# Patient Record
Sex: FEMALE | Race: WHITE | Employment: UNEMPLOYED | URBAN - METROPOLITAN AREA
[De-identification: names, ages, dates, MRNs, and addresses within clinical notes are randomized per-mention and may not be internally consistent; named-entity substitution may affect disease eponyms.]

---

## 2017-11-26 PROBLEM — N17.9 AKI (ACUTE KIDNEY INJURY) (HCC): Status: ACTIVE | Noted: 2017-11-26

## 2018-02-22 ENCOUNTER — TELEPHONE (OUTPATIENT)
Dept: PULMONOLOGY | Age: 63
End: 2018-02-22

## 2018-09-05 ENCOUNTER — HOSPITAL ENCOUNTER (OUTPATIENT)
Dept: WOUND CARE | Age: 63
Discharge: HOME | End: 2018-09-05

## 2018-09-05 VITALS
SYSTOLIC BLOOD PRESSURE: 134 MMHG | BODY MASS INDEX: 21.28 KG/M2 | WEIGHT: 108.4 LBS | TEMPERATURE: 99.1 F | DIASTOLIC BLOOD PRESSURE: 79 MMHG | HEIGHT: 60 IN | HEART RATE: 85 BPM | RESPIRATION RATE: 16 BRPM

## 2018-09-05 DIAGNOSIS — D64.9 NORMOCYTIC ANEMIA: ICD-10-CM

## 2018-09-05 DIAGNOSIS — N18.2 CKD (CHRONIC KIDNEY DISEASE) STAGE 2, GFR 60-89 ML/MIN: ICD-10-CM

## 2018-09-05 DIAGNOSIS — G89.29 CHRONIC PAIN OF LEFT ANKLE: Primary | ICD-10-CM

## 2018-09-05 DIAGNOSIS — M25.572 CHRONIC PAIN OF LEFT ANKLE: Primary | ICD-10-CM

## 2018-09-05 DIAGNOSIS — T81.31XA SURGICAL WOUND DEHISCENCE, INITIAL ENCOUNTER: ICD-10-CM

## 2018-09-05 DIAGNOSIS — R45.89 SYMPTOMS OF DEPRESSION: ICD-10-CM

## 2018-09-05 DIAGNOSIS — M79.2 NEUROPATHIC PAIN OF LEFT LOWER EXTREMITY: ICD-10-CM

## 2018-09-05 PROCEDURE — 99203 OFFICE O/P NEW LOW 30 MIN: CPT | Performed by: INTERNAL MEDICINE

## 2018-09-05 PROCEDURE — 99213 OFFICE O/P EST LOW 20 MIN: CPT

## 2018-09-05 PROCEDURE — 97597 DBRDMT OPN WND 1ST 20 CM/<: CPT

## 2018-09-05 PROCEDURE — 97597 DBRDMT OPN WND 1ST 20 CM/<: CPT | Performed by: INTERNAL MEDICINE

## 2018-09-05 RX ORDER — M-VIT,TX,IRON,MINS/CALC/FOLIC 27MG-0.4MG
1 TABLET ORAL DAILY
COMMUNITY

## 2018-09-05 RX ORDER — ASPIRIN 81 MG/1
81 TABLET, CHEWABLE ORAL DAILY
COMMUNITY

## 2018-09-05 RX ORDER — HYDROCODONE BITARTRATE AND ACETAMINOPHEN 5; 325 MG/1; MG/1
1 TABLET ORAL 3 TIMES DAILY PRN
Qty: 21 TABLET | Refills: 0 | Status: SHIPPED | OUTPATIENT
Start: 2018-09-05 | End: 2018-09-12

## 2018-09-05 RX ORDER — LIDOCAINE 40 MG/G
CREAM TOPICAL PRN
Status: DISCONTINUED | OUTPATIENT
Start: 2018-09-05 | End: 2018-09-06 | Stop reason: HOSPADM

## 2018-09-05 ASSESSMENT — PAIN DESCRIPTION - DESCRIPTORS: DESCRIPTORS: SHOOTING

## 2018-09-05 ASSESSMENT — PAIN DESCRIPTION - ORIENTATION: ORIENTATION: LEFT

## 2018-09-05 ASSESSMENT — PAIN DESCRIPTION - PAIN TYPE: TYPE: CHRONIC PAIN

## 2018-09-05 ASSESSMENT — PAIN DESCRIPTION - LOCATION: LOCATION: ANKLE

## 2018-09-05 ASSESSMENT — PAIN DESCRIPTION - FREQUENCY: FREQUENCY: INTERMITTENT

## 2018-09-05 ASSESSMENT — PAIN DESCRIPTION - PROGRESSION: CLINICAL_PROGRESSION: NOT CHANGED

## 2018-09-05 ASSESSMENT — PAIN DESCRIPTION - ONSET: ONSET: ON-GOING

## 2018-09-05 ASSESSMENT — PAIN SCALES - GENERAL: PAINLEVEL_OUTOF10: 7

## 2018-09-06 ENCOUNTER — CLINICAL DOCUMENTATION (OUTPATIENT)
Dept: SPIRITUAL SERVICES | Age: 63
End: 2018-09-06

## 2018-09-06 ENCOUNTER — TELEPHONE (OUTPATIENT)
Dept: FAMILY MEDICINE CLINIC | Age: 63
End: 2018-09-06

## 2018-09-07 ENCOUNTER — CLINICAL DOCUMENTATION (OUTPATIENT)
Dept: SPIRITUAL SERVICES | Age: 63
End: 2018-09-07

## 2018-09-07 RX ORDER — DOXYCYCLINE 100 MG/1
100 CAPSULE ORAL 2 TIMES DAILY
Qty: 20 CAPSULE | Refills: 0 | Status: SHIPPED | OUTPATIENT
Start: 2018-09-07 | End: 2018-09-12 | Stop reason: ALTCHOICE

## 2018-09-10 ENCOUNTER — CLINICAL DOCUMENTATION (OUTPATIENT)
Dept: SPIRITUAL SERVICES | Age: 63
End: 2018-09-10

## 2018-09-11 PROBLEM — F41.1 ANXIETY STATE: Status: ACTIVE | Noted: 2018-09-11

## 2018-09-11 PROBLEM — M54.50 CHRONIC LOW BACK PAIN WITHOUT SCIATICA: Status: ACTIVE | Noted: 2018-09-11

## 2018-09-11 PROBLEM — M45.9 ANKYLOSING SPONDYLITIS (HCC): Status: ACTIVE | Noted: 2018-09-11

## 2018-09-11 PROBLEM — F17.200 TOBACCO USE DISORDER: Status: ACTIVE | Noted: 2018-09-11

## 2018-09-11 PROBLEM — J44.9 OTHER SPECIFIED CHRONIC OBSTRUCTIVE AIRWAYS DISEASE: Status: ACTIVE | Noted: 2018-09-11

## 2018-09-11 PROBLEM — N18.2 CKD (CHRONIC KIDNEY DISEASE) STAGE 2, GFR 60-89 ML/MIN: Status: ACTIVE | Noted: 2018-09-11

## 2018-09-11 PROBLEM — D64.9 NORMOCYTIC ANEMIA: Status: ACTIVE | Noted: 2018-09-11

## 2018-09-11 PROBLEM — G89.29 CHRONIC LOW BACK PAIN WITHOUT SCIATICA: Status: ACTIVE | Noted: 2018-09-11

## 2018-09-11 PROBLEM — Z79.891 LONG-TERM CURRENT USE OF OPIATE ANALGESIC: Status: ACTIVE | Noted: 2018-06-05

## 2018-09-11 PROBLEM — N17.9 AKI (ACUTE KIDNEY INJURY) (HCC): Status: RESOLVED | Noted: 2017-11-26 | Resolved: 2018-09-11

## 2018-09-11 PROBLEM — F41.9 ANXIETY AND DEPRESSION: Status: ACTIVE | Noted: 2018-09-11

## 2018-09-11 PROBLEM — M54.9 BACKACHE: Status: ACTIVE | Noted: 2018-09-11

## 2018-09-11 PROBLEM — T81.31XA SURGICAL WOUND DEHISCENCE, INITIAL ENCOUNTER: Status: ACTIVE | Noted: 2018-09-11

## 2018-09-11 PROBLEM — M79.2 NEUROPATHIC PAIN OF LEFT LOWER EXTREMITY: Status: ACTIVE | Noted: 2018-09-11

## 2018-09-11 PROBLEM — M47.816 DJD (DEGENERATIVE JOINT DISEASE), LUMBAR: Status: ACTIVE | Noted: 2018-09-11

## 2018-09-11 PROBLEM — F32.A ANXIETY AND DEPRESSION: Status: ACTIVE | Noted: 2018-09-11

## 2018-09-11 PROBLEM — J31.0 CHRONIC RHINITIS: Status: ACTIVE | Noted: 2018-09-11

## 2018-09-11 NOTE — H&P
prescribing any pain medication at all from now on; Mrs. Rachelle Hudson doesn't really have a plan for how she's going to handle that; apparently the possibility of a pain mgmt referral was mentioned, but nothing was actually done (?). She's also been having a very hard time in recent months with the loss of her , the loss of her brother, and her need to move back to [address] [place] [address] [employer] for some support from extended family. She tells me that she doesn't really have the local support she thought she'd have, and she's struggling as the primary caregiver of her 6year-old great-granddaughter. She does admit to occasional thoughts of suicide, or more commonly just wishing that she wouldn't wake up the next day, but she knows that she has to keep going for her great-granddaughter, whom she describes as a great kid, very strong, and who \"takes care of her as much as she takes care of the child\". Denies any suicidal plan or active ideation today. Additional ulcer(s) noted? no.      Ms. Rachelle Hudson has a past medical history of ALBERTO (acute kidney injury) ([facility] Bronchitis; Rotator cuff tear; Seizure ([facility] and Shingles. She has a past surgical history that includes Tubal ligation and Ankle fracture surgery (Left, 06/25/2018). Her family history includes Cancer in her brother and father; Kidney Disease in her sister. Ms. Rachelle Hudson reports that she has been smoking. She has a 20.00 pack-year smoking history. She has never used smokeless tobacco. She reports that she does not drink alcohol or use drugs. Her current medication list consists of HYDROcodone-acetaminophen, albuterol, aspirin, gabapentin, therapeutic multivitamin-minerals, and venlafaxine.  She tells me that she has enough gabapentin to last at least another week, but is out of Norco.    Allergies: Lisinopril; Nabumetone; Propoxyphene; Sulfa antibiotics; and Nickel    Pertinent items from the review of systems are discussed in the HPI; the remainder of the ROS was reviewed and is negative. Objective:     Vitals:    09/05/18 1239   BP: 134/79   Pulse: 85   Resp: 16   Temp: 99.1 °F (37.3 °C)   TempSrc: Oral   Weight: 108 lb 6.4 oz (49.2 kg)   Height: 5' (1.524 m)     TRACEE today -- 0.96 bilateral (arm systolics 906 & 252, right ankle 120 & 128, left ankle 128 x 2). Constitutional:  well-developed, thin, weak, fatigued, chronically ill appearing  Psychiatric:  oriented to person, place and time; affect quite depressed, tearful at times  Eyes:  pupils equal, round and reactive to light; sclerae anicteric, conjunctivae not pale  ENT: no thrush or oral ulcers, mucous membranes moist  Abd: soft, NT, ND, good BS  Cardiovascular:  bilateral pedal pulses palpable; mild left lower extremity edema, foot warm, good cap refill  Lymphatic:  no inguinal or popliteal adenopathy, no lymphangitis or cellulitis  Musculoskeletal:  no clubbing, cyanosis or petechiae; RLE and LLE with no gross effusions, joint misalignment or acute arthritis  Bre-ulcer skin:  Induration, Warmth, Pink and mildly macerated hyperkeratosis. Ulcer(s):  a few small areas of superficial dehiscence, with a red base, a bit of fibrin and biofilm, no deep tissue or hardware exposure, mild serous exudate; then at her lateral malleolus there is a tiny focus that looks like it might be a pustule with a pinpoint, black agustina in its center. Photos also saved in electronic chart.     Today's Wound Measurements:  Wound 09/05/18 #1- L lateral lower leg, surgical dehis, partial thickness, onset 6/25/2018, surgical -Wound Length (cm): 3 cm    Wound 09/05/18 #1- L lateral lower leg, surgical dehis, partial thickness, onset 6/25/2018, surgical -Wound Width (cm): 0.4 cm    Wound 09/05/18 #1- L lateral lower leg, surgical dehis, partial thickness, onset 6/25/2018, surgical -Wound Depth (cm) : 0.2  _____________________________    Lab Results   Component Value Date    LABALBU 3.6 11/27/2017     Lab Results   Component Value Date CREATININE 1.2 11/27/2017     Lab Results   Component Value Date    HGB 9.1 (L) 11/27/2017     More recently, in [place] on 8/7/18 -- ESR 46, cRP 32, Hgb A1c 5.6%, creat 0.97, albumin 4.3, liver enzymes normal (alk phos 106), WBC 9.2, ANC 6600, Hgb 11.1, MCV 95, plts 421k. No wound cultures. July 31 XR -- The patient is status post ORIF of left ankle fractures. At the distal tibia there are 2 oblique screws through the medial malleolus. At the distal fibula there is a metallic side plate and multiple associated screws. On these images, normal anatomic alignment and position. No hardware complication. The ankle joint space is intact. Assessment:     Patient Active Problem List   Diagnosis Code    Ankylosing spondylitis ([facility] M45.9    Anxiety and depression F41.9, F32.9    Chronic low back pain without sciatica M54.5, G89.29    COPD (chronic obstructive pulmonary disease) ([facility]) J44.9    IBS (irritable bowel syndrome) K58.9    Insomnia G47.00    Long-term current use of opiate analgesic Z79.891    Chronic rhinitis J31.0    Other and unspecified hyperlipidemia E78.5    Tobacco use disorder F17.200    Surgical wound dehiscence, initial encounter T81.31XA    Neuropathic pain of left lower extremity G57.92    DJD (degenerative joint disease), lumbar M47.816    Normocytic anemia D64.9    CKD (chronic kidney disease) stage 2, GFR 60-89 ml/min N18.2       Assessment of today's active condition(s): Hx trimalleolar ankle fracture, ORIF, healed tibial wound, superficial dehiscence of lateral wound, any recent soft tissue infection seems resolved, apart from perhaps that tiny pustule at her malleolus, but no strong suspicion of osteomyelitis or hardware infection. Pain out of proportion to exam I think because of her chronic pain syndrome and neuropathy. Chitra might be risking as much skin damage as it is helping the wound at this point; she ought to do fine with a bit more debridement and just simple local care. was completed with a small amount of bleeding, and hemostasis was by pressure. The patient tolerated the procedure well, with no significant complications. The patient's level of pain during and after the procedure was monitored, and is noted in the wound documentation flowsheet. Discharge plan:     Treatment in the wound care center today: Wound 09/05/18 #1- L lateral lower leg, surgical dehis, partial thickness, onset 6/25/2018, surgical -Dressing/Treatment: Other (Comment) (PSO, 4x4, shruthi, single e tubi). I reminded the patient of the importance of weight management and smoking cessation, if applicable; also encouraged ambulation as tolerated, additional lower extremity exercises as instructed in our education sheet, leg elevation when at rest, and compliance with any recommended dietary, diuretic and compression therapies. I'm hoping a bit of edema treatment might help her pain to improve. Continue gabapentin at current doses for now (two 600 mg tabs, TID), and I can give her a new script next week, if she is running out. I reviewed her OARRS report today, and discussed the risks and benefits of both giving her a supply of Norco vs not giving her any narcotic pain medication at all. She understands the risks of both approaches, and we agreed that I would prescribe her weekly supplies of Norco for now. It is absolutely mandatory that she contact her PCP right away to arrange a pain mgmt consultation for her, for longer term mgmt. She understands that once her wound is healed, I will not be here to fill those prescriptions; she'd actually like to get off narcotics if she can, which I think is admirable. She had typically been prescribed 2 per day for her back, but was sometimes taking as many as 4 per day for her back + ankle. I called in a script for 3 per day for the week, and we'll try to wean her off over the next couple of weeks.  I informed her of my policy re: narcotic medications (she must get them only from me, only at one pharmacy, must consent to pill counts and drug screens if requested, can not get refills unless she's here for scheduled appts, etc). She voiced understanding. I'm also going to consult the spiritual care department to call and offer her some support, and will consult one of the outpatient behavioral health professionals to offer some counseling, and help to decide if she might also benefit from any change in antidepressant medication. Home treatment: good handwashing before and after any dressing changes. Cleanse ulcer with saline or soap & water before dressing change. May use Vaseline (petrolatum), Aquaphor, Aveeno, CeraVe, Cetaphil, Eucerin, Lubriderm, etc for dry skin. Dressing type for home: Other: as above, daily. Written discharge instructions given to patient. Follow up in 1 week.     Electronically signed by Namrata Rico MD on 9/11/2018 at 8:06 AM.

## 2018-09-12 ENCOUNTER — HOSPITAL ENCOUNTER (OUTPATIENT)
Dept: WOUND CARE | Age: 63
Discharge: HOME | End: 2018-09-12

## 2018-09-12 VITALS — RESPIRATION RATE: 18 BRPM

## 2018-09-12 DIAGNOSIS — G89.29 CHRONIC MIDLINE LOW BACK PAIN WITHOUT SCIATICA: Primary | ICD-10-CM

## 2018-09-12 DIAGNOSIS — M54.50 CHRONIC MIDLINE LOW BACK PAIN WITHOUT SCIATICA: Primary | ICD-10-CM

## 2018-09-12 PROCEDURE — 99212 OFFICE O/P EST SF 10 MIN: CPT

## 2018-09-12 PROCEDURE — 99213 OFFICE O/P EST LOW 20 MIN: CPT | Performed by: INTERNAL MEDICINE

## 2018-09-12 RX ORDER — HYDROCODONE BITARTRATE AND ACETAMINOPHEN 5; 325 MG/1; MG/1
1 TABLET ORAL EVERY 6 HOURS PRN
Qty: 50 TABLET | Refills: 0 | Status: SHIPPED | OUTPATIENT
Start: 2018-09-12 | End: 2018-09-26

## 2018-09-12 RX ORDER — CEPHALEXIN 500 MG/1
500 CAPSULE ORAL 4 TIMES DAILY
Qty: 28 CAPSULE | Refills: 0 | Status: SHIPPED | OUTPATIENT
Start: 2018-09-12 | End: 2018-09-19

## 2018-09-12 RX ORDER — GABAPENTIN 600 MG/1
1200 TABLET ORAL EVERY 8 HOURS
Qty: 180 TABLET | Refills: 1 | Status: SHIPPED | OUTPATIENT
Start: 2018-09-12 | End: 2020-05-05

## 2018-09-12 RX ORDER — LIDOCAINE 40 MG/G
CREAM TOPICAL ONCE
Status: DISCONTINUED | OUTPATIENT
Start: 2018-09-12 | End: 2018-09-13 | Stop reason: HOSPADM

## 2018-09-12 RX ORDER — CEPHALEXIN 500 MG/1
500 CAPSULE ORAL 3 TIMES DAILY
COMMUNITY
End: 2018-09-12

## 2018-09-12 ASSESSMENT — PAIN SCALES - GENERAL: PAINLEVEL_OUTOF10: 7

## 2018-09-12 ASSESSMENT — PAIN DESCRIPTION - ORIENTATION: ORIENTATION: LEFT

## 2018-09-12 ASSESSMENT — PAIN DESCRIPTION - PAIN TYPE: TYPE: CHRONIC PAIN

## 2018-09-12 ASSESSMENT — PAIN DESCRIPTION - LOCATION: LOCATION: ANKLE

## 2018-09-12 NOTE — PLAN OF CARE
Problem: Wound:  Goal: Will show signs of wound healing; wound closure and no evidence of infection  Will show signs of wound healing; wound closure and no evidence of infection   Outcome: Ongoing  Wound healed. Pt. To cont. With Keflex antibiotic for a cellulitis she had over the weekend & new script sent to pharmacy per Dr. Jenelle Hunt. Pt. Cont. To have significant neuropathy pain, is trying to get a pain management referral from her PCP & states she is about out of her Gabapentin. Scripts for both Norco & Gabapentin sent to pharmacy per Dr. Jenelle Hunt. Dr. Jenelle Hunt was very clear to explain that pt. Needs to follow up with PCP in regards to getting scheduled with pain management, for long-term medication refills & pt. Verbalizes understanding. Patient also states that she has been in touch with the Spiritual Car Dept. x2 over the last week. Pt. Also inquiring about the Behavioral Health team & states that she hasn't receive a call from them. Pt. Did also state that her VM on her phone has her middle name of \"Rebeca\" on it & they may have been reluctant to leave a message, thinking they may have had the wrong Ph#. Pt. Did confirm her correct ph. # & Dr. Jenelle Hunt to again reach out to the [address] [place] [address] team. Pt. To f/u in [address] [place] [address],[address] in 2 weeks as ordered. Discharge instructions reviewed with patient, all questions answered, copy given to patient.

## 2018-09-20 NOTE — PROGRESS NOTES
It does sound like she's been responsible with her Norco this week, but the #21 of the 5mg tabs didn't help too much, jeramy with the infection flare-up. She had been taking 10 mg tabs before. Additional ulcer(s) noted? no.      Ms. Jacob yonug has a past medical history of ALBERTO (acute kidney injury) ([address] Bronchitis; Rotator cuff tear; Seizure ([address] and Shingles. She has a past surgical history that includes Tubal ligation and Ankle fracture surgery (Left, 06/25/2018). Her family history includes Cancer in her brother and father; Kidney Disease in her sister. Ms. Jacob young reports that she has been smoking. She has a 20.00 pack-year smoking history. She has never used smokeless tobacco. She reports that she does not drink alcohol or use drugs. Her current medication list consists of cephalexin, HYDROcodone-acetaminophen, albuterol, aspirin, gabapentin, therapeutic multivitamin-minerals, and venlafaxine. Allergies: Lisinopril; Nabumetone; Propoxyphene; Sulfa antibiotics; and Nickel    Pertinent items from the review of systems are discussed in the HPI; the remainder of the ROS was reviewed and is negative. Objective:     Vitals:    09/12/18 1325   Resp: 18   TempSrc: Oral     Nurses failed to document other vital signs.      Constitutional:  well-developed, thin, fatigued, chronically ill appearing  Psychiatric:  oriented to person, place and time; mood stated as depressed, affect both depressed and anxious   Eyes:  pupils equal, round and reactive to light; sclerae anicteric, conjunctivae not pale  ENT: no thrush or oral ulcers, mucous membranes moist  Abd: soft, NT, ND, good BS  Cardiovascular:  bilateral pedal pulses palpable; very mild left lower extremity edema  Lymphatic:  no inguinal or popliteal adenopathy, no cellulitis or angitis  Musculoskeletal:  no clubbing, cyanosis or petechiae; RLE and LLE with no gross effusions, joint misalignment or acute arthritis  Bre-ulcer skin:  Induration, MD Elliott on 9/20/2018 at 2:36 PM.

## 2018-09-21 ENCOUNTER — CLINICAL DOCUMENTATION (OUTPATIENT)
Dept: SPIRITUAL SERVICES | Age: 63
End: 2018-09-21

## 2018-09-21 NOTE — PROGRESS NOTES
Outpatient [facility] team member attempted telephone call to pt. No answer; voice message was left encouraging patient to contact [phone] [phone]. Contact information for [facility] provided in message.

## 2018-09-26 ENCOUNTER — HOSPITAL ENCOUNTER (OUTPATIENT)
Dept: WOUND CARE | Age: 63
Discharge: HOME | End: 2018-09-26

## 2018-09-26 VITALS
WEIGHT: 106.2 LBS | BODY MASS INDEX: 20.74 KG/M2 | HEART RATE: 85 BPM | SYSTOLIC BLOOD PRESSURE: 117 MMHG | DIASTOLIC BLOOD PRESSURE: 51 MMHG | RESPIRATION RATE: 20 BRPM | TEMPERATURE: 97.8 F

## 2018-09-26 DIAGNOSIS — M25.572 CHRONIC PAIN OF LEFT ANKLE: Primary | ICD-10-CM

## 2018-09-26 DIAGNOSIS — G89.29 CHRONIC PAIN OF LEFT ANKLE: Primary | ICD-10-CM

## 2018-09-26 PROCEDURE — 99212 OFFICE O/P EST SF 10 MIN: CPT

## 2018-09-26 PROCEDURE — 99213 OFFICE O/P EST LOW 20 MIN: CPT | Performed by: INTERNAL MEDICINE

## 2018-09-26 RX ORDER — HYDROCODONE BITARTRATE AND ACETAMINOPHEN 5; 325 MG/1; MG/1
1 TABLET ORAL 3 TIMES DAILY PRN
Qty: 40 TABLET | Refills: 0 | Status: SHIPPED | OUTPATIENT
Start: 2018-09-26 | End: 2018-10-10

## 2018-09-26 ASSESSMENT — PAIN DESCRIPTION - FREQUENCY: FREQUENCY: INTERMITTENT

## 2018-09-26 ASSESSMENT — PAIN SCALES - GENERAL: PAINLEVEL_OUTOF10: 6

## 2018-09-26 ASSESSMENT — PAIN DESCRIPTION - ONSET: ONSET: ON-GOING

## 2018-09-26 ASSESSMENT — PAIN DESCRIPTION - LOCATION: LOCATION: ANKLE

## 2018-09-26 ASSESSMENT — PAIN DESCRIPTION - PAIN TYPE: TYPE: CHRONIC PAIN

## 2018-09-26 ASSESSMENT — PAIN DESCRIPTION - ORIENTATION: ORIENTATION: LEFT

## 2018-09-26 ASSESSMENT — PAIN DESCRIPTION - DESCRIPTORS: DESCRIPTORS: BURNING;STABBING

## 2018-09-26 ASSESSMENT — PAIN DESCRIPTION - PROGRESSION: CLINICAL_PROGRESSION: NOT CHANGED

## 2018-09-28 ENCOUNTER — TELEPHONE (OUTPATIENT)
Dept: FAMILY MEDICINE CLINIC | Age: 63
End: 2018-09-28

## 2018-09-28 NOTE — TELEPHONE ENCOUNTER
Good morning.   I left another message for patient to schedule an appointment with [facility] [place]  (3rd attempt) - Ignacio Long)

## 2018-10-03 ENCOUNTER — CLINICAL DOCUMENTATION (OUTPATIENT)
Dept: SPIRITUAL SERVICES | Age: 63
End: 2018-10-03

## 2018-10-04 PROBLEM — R19.7 DIARRHEA: Status: ACTIVE | Noted: 2018-10-04

## 2018-10-04 PROBLEM — T81.31XA SURGICAL WOUND DEHISCENCE, INITIAL ENCOUNTER: Status: RESOLVED | Noted: 2018-09-11 | Resolved: 2018-10-04

## 2018-10-04 PROBLEM — R10.9 ABDOMINAL PAIN: Status: ACTIVE | Noted: 2018-10-04

## 2018-10-04 NOTE — PROGRESS NOTES
long as she doesn't develop fever, shaking chills, more severe pain, more significantly spreading redness, etc. I did let her know that she may well need surgery for hardware removal and bone debridement at some point, depending on her ongoing clinical course. Continue use of gabapentin. I'll call Dr. Panchito Rico office to see if they've received an official referral; if not, I'll send paperwork myself. Calling in a script for an additional [phone] today, with the hopes that she can wean down from TID to BID and then eventually off entirely. I'll call the [facility] to see if they can have her schedule with them to see a GI specialist.    Spiritual Care department here continuing to try to reach her for some spiritual support and counseling. Behavioral Health (outpatient LISW or clinical psychologist) also trying to reach her to offer their services, in terms of therapy and also any advice on ongoing medication therapy. Written discharge instructions given to patient. Follow up here PRN.     Electronically signed by Topher Keita MD on 10/4/2018 at 3:50 PM.

## 2018-10-08 ENCOUNTER — CLINICAL DOCUMENTATION (OUTPATIENT)
Dept: SPIRITUAL SERVICES | Age: 63
End: 2018-10-08

## 2018-10-10 ENCOUNTER — OFFICE VISIT (OUTPATIENT)
Dept: ORTHOPEDIC SURGERY | Age: 63
End: 2018-10-10

## 2018-10-10 VITALS
BODY MASS INDEX: 20.86 KG/M2 | HEIGHT: 60 IN | WEIGHT: 106.26 LBS | HEART RATE: 81 BPM | DIASTOLIC BLOOD PRESSURE: 68 MMHG | SYSTOLIC BLOOD PRESSURE: 117 MMHG

## 2018-10-10 DIAGNOSIS — T81.41XA ABSCESS INVOLVING SUTURE: Primary | ICD-10-CM

## 2018-10-10 PROCEDURE — 99203 OFFICE O/P NEW LOW 30 MIN: CPT | Performed by: PODIATRIST

## 2018-10-10 NOTE — PROGRESS NOTES
HISTORY OF PRESENT ILLNESS: This is an initial visit for a 29-year-old female presents with pain to her left ankle. On June 25 she was vacationing in [place] when she unfortunately fractured her left ankle. Open reduction internal fixation was performed at that time. In the postoperative course she had to come back to this area so physical therapy as yet to be started. She's been having pain to the side of the ankle and there is suspicion for infection. She is currently on an oral antibiotic. FAMILY HISTORY: Documented in chart. SOCIAL HISTORY: Documented in chart. REVIEW OF SYSTEMS:  The patient denies any issues with dermatologic, pulmonary, cardiovascular, genitourinary, hematologic, gastrointestinal, neurologic, psychiatric, and HEENT systems. Family History, Social History, and Review of Systems were reviewed from patient history form dated on 10/10/2018 and available in the patient's chart under the MEDIA tab. PHYSICAL EXAMINATION:     The patient is alert and orientated x3. A pinhole opening is noted at the distal aspect of the incision at the lateral surface of the ankle. There is minimal edema and no erythema or ascending cellulitis noted. There is no purulent drainage. She has palpable pedal pulses bilateral.  Her sensation is grossly intact bilateral.    The remainder of the exam is unremarkable. RADIOGRAPHS: None taken      ASSESSMENT: Suture abscess, left ankle      PLAN: The patient was educated on the pathology and its treatment options. The presentation is most consistent with suture abscess. At this time I'm not able to see a Vicryl stitch that is fairly focal culprit. Eventually, this may present itself more at which point we can debride it in the office. I'll see her back in 2 weeks.

## 2018-10-16 ENCOUNTER — OFFICE VISIT (OUTPATIENT)
Dept: INTERNAL MEDICINE CLINIC | Age: 63
End: 2018-10-16

## 2018-10-16 VITALS
HEIGHT: 60 IN | BODY MASS INDEX: 21.79 KG/M2 | WEIGHT: 111 LBS | DIASTOLIC BLOOD PRESSURE: 78 MMHG | SYSTOLIC BLOOD PRESSURE: 178 MMHG | OXYGEN SATURATION: 97 % | HEART RATE: 86 BPM

## 2018-10-16 VITALS
HEART RATE: 86 BPM | BODY MASS INDEX: 21.68 KG/M2 | SYSTOLIC BLOOD PRESSURE: 192 MMHG | OXYGEN SATURATION: 97 % | RESPIRATION RATE: 16 BRPM | WEIGHT: 111 LBS | DIASTOLIC BLOOD PRESSURE: 94 MMHG

## 2018-10-16 DIAGNOSIS — D64.9 ANEMIA, UNSPECIFIED TYPE: ICD-10-CM

## 2018-10-16 DIAGNOSIS — I10 ESSENTIAL HYPERTENSION: Primary | ICD-10-CM

## 2018-10-16 DIAGNOSIS — R03.0 ELEVATED BLOOD PRESSURE READING: ICD-10-CM

## 2018-10-16 DIAGNOSIS — R73.9 HYPERGLYCEMIA: ICD-10-CM

## 2018-10-16 DIAGNOSIS — R19.5 ABNORMAL STOOLS: Primary | ICD-10-CM

## 2018-10-16 DIAGNOSIS — J43.9 PULMONARY EMPHYSEMA, UNSPECIFIED EMPHYSEMA TYPE (HCC): ICD-10-CM

## 2018-10-16 LAB
EXPIRATORY TIME: ABNORMAL SEC
FEF 25-75% %PRED-PRE: ABNORMAL L/SEC
FEF 25-75% PRED: ABNORMAL L/SEC
FEF 25-75%-PRE: ABNORMAL L/SEC
FEV1 %PRED-PRE: 2.13 %
FEV1 PRED: ABNORMAL L
FEV1/FVC %PRED-PRE: ABNORMAL %
FEV1/FVC PRED: ABNORMAL %
FEV1/FVC: 1.87 %
FEV1: ABNORMAL L
FVC %PRED-PRE: ABNORMAL %
FVC PRED: ABNORMAL L
FVC: ABNORMAL L
HBA1C MFR BLD: 5.1 %
PEF %PRED-PRE: ABNORMAL L/SEC
PEF PRED: ABNORMAL L/SEC
PEF-PRE: ABNORMAL L/SEC

## 2018-10-16 PROCEDURE — 99999 PR OFFICE/OUTPT VISIT,PROCEDURE ONLY: CPT | Performed by: INTERNAL MEDICINE

## 2018-10-16 RX ORDER — VENLAFAXINE HYDROCHLORIDE 150 MG/1
150 CAPSULE, EXTENDED RELEASE ORAL DAILY
Qty: 30 CAPSULE | Refills: 3 | Status: SHIPPED | OUTPATIENT
Start: 2018-10-16 | End: 2018-12-04

## 2018-10-16 RX ORDER — AMLODIPINE BESYLATE 5 MG/1
5 TABLET ORAL DAILY
Qty: 30 TABLET | Refills: 3 | Status: SHIPPED | OUTPATIENT
Start: 2018-10-16 | End: 2018-11-06 | Stop reason: SDUPTHER

## 2018-10-16 ASSESSMENT — PULMONARY FUNCTION TESTS
FEV1/FVC: 1.87
FEV1_PERCENT_PREDICTED_PRE: 2.13

## 2018-10-16 NOTE — PROGRESS NOTES
SUBJECTIVE:   New patient, referred by wound care. History of anxiety. History of left ankle fracture requiring surgery in [place] in June 2018 with subsequent infection requiring wound care. She was followed by Dr. Shannan De ([facility]) and is unable to get her usual Neurontin and Norco for ankylosing spondylitis. She was prescribed lisinopril in the past but this resulted in reaction that required hospitalization for acute kidney injury. She is taking venlafaxine only once per day (rather than prescribed TID) due to expense. She is smoking 0.5 - 1 ppd due to anxiety and grief related to 's death. She takes care of great granddaughter, age 6. She is using albuterol inhaler 2-3 times per day. She was followed by pulmonary in [place] and prescribed LAMA and LABA but unable to afford due to loss of insurance. She will be seen by gastroenterology (Dr. Severa Brooklyn) later today. MEDICATIONS:  gabapentin (NEURONTIN) 600 MG tablet Take 2 tablets by mouth every 8 hours for 30 days. Laurena Rad aspirin 81 MG chewable tablet Take 81 mg by mouth daily   venlafaxine (EFFEXOR) 100 MG tablet Take 100 mg by mouth 3 times daily   albuterol (PROVENTIL) (2.5 MG/3ML) 0.083% nebulizer solution Take 2.5 mg by nebulization every 6 hours as needed for Wheezing       LABS: 11/27/2017  WBC 11.9 (H)   HGB 9.1 (L)      MCV 88.3  RDW 17.2     (L)   K 4.7   CL 99   CO2 22   BUN 22 (H)   CREATININE 1.2   GLUCOSE 250 (H)     PFT: 1.27 (60%) / 1.95 (70%) = 0.65 c/w Moderately severe obstruction, with low vital capacity. OBJECTIVE:    BP (!) 192/94   Pulse 86   Resp 16   Wt 111 lb (50.3 kg)   SpO2 97%   BMI 21.68 kg/m²   HEENT:  Oropharynx clear, no lymphadenopathy,   LUNGS:  Clear and without wheezes  HEART:  Regular rhythm, no appreciable murmur  ABD:  Benign, active bowel sounds  EXT:  No edema, pulses palpable  NEURO:  No focal neurologic deficits noted. ASSESSMENT / PLAN:   1.  Hypertension (elevated

## 2018-10-16 NOTE — PROGRESS NOTES
MD   aspirin 81 MG chewable tablet Take 81 mg by mouth daily    Historical Provider, MD   Multiple Vitamins-Minerals (THERAPEUTIC MULTIVITAMIN-MINERALS) tablet Take 1 tablet by mouth daily    Historical Provider, MD   albuterol (PROVENTIL) (2.5 MG/3ML) 0.083% nebulizer solution Take 2.5 mg by nebulization every 6 hours as needed for Wheezing    Historical Provider, MD        Allergies: Allergies   Allergen Reactions    Lisinopril Other (See Comments) and Shortness Of Breath     Cough, SOB  Cough, SOB    Nabumetone Swelling     Other reaction(s): Unknown (specify in comments)    No Known Allergies Swelling    Propoxyphene Swelling     Other reaction(s): Unknown (specify in comments)    Sulfa Antibiotics Swelling    Nickel Rash       Past Medical History:   Diagnosis Date    ALBERTO (acute kidney injury) ([facility] 11/26/2017    Bronchitis     Rotator cuff tear 12/11/2015    Seizure ([facility]     Shingles     Surgical wound dehiscence, initial encounter 9/11/2018     Past Surgical History:   Procedure Laterality Date    ANKLE FRACTURE SURGERY Left 06/25/2018    Dr. Isidro Goldberg, [facility]    COLONOSCOPY  03/24/2016    \"Small Rectal polyp\" and Diverticulosis    COLONOSCOPY  9191    Date uncertain    COLONOSCOPY  1094    Date Uncertain    COLONOSCOPY  5106    Date Uncertain    TUBAL LIGATION         Social:   Social History   Substance Use Topics    Smoking status: Current Every Day Smoker     Packs/day: 0.50     Years: 40.00    Smokeless tobacco: Never Used    Alcohol use No     Family:   Family History   Problem Relation Age of Onset    Cancer Father     Kidney Disease Sister     Cancer Brother     Other Maternal Uncle         Anylosing Spondylitis       ROS: Pertinent items are noted in HPI.     Objective:   Vital Signs:     See encounter earlier today: BP (!) 192/94   Pulse 86   Resp 16   Wt 111 lb (50.3 kg)   SpO2 97%   BMI 21.68 kg/m²   Now 178/78     Physical Exam: General appearance: alert, appears stated age, cooperative, no distress and thin  Eyes: negative findings: conjunctivae and sclerae normal  Neck: no adenopathy, no carotid bruit, no JVD, supple, symmetrical, trachea midline and thyroid not enlarged, symmetric, no tenderness/mass/nodules  Lungs: clear to auscultation bilaterally and normal percussion bilaterally  Heart: regular rate and rhythm, S1, S2 normal, no murmur, click, rub or gallop  Abdomen: normal findings: bowel sounds normal, liver span normal to percussion and no organomegaly and abnormal findings:  mild diffuse, non-reproducible tenderness  Extremities: extremities normal, atraumatic, no cyanosis or edema  Pulses: 2+ and symmetric  Skin: eczema - scattered  Lymph nodes: Cervical, supraclavicular, and axillary nodes normal.  Neurologic: Mental status: Alert, oriented, thought content appropriate    Lab and Imaging Review   Component 08/07/18 [facility]    WBC 9.2    HGB 11.1    MCV 95.2        BUN 16    CREATININE 1.0    GLUCOSE 98    AST 22    ALT 17    ALKPHOS 106    BILITOT 0.2    LABA1C 5.6    ESR 46       Assessment:     Probable self limited colitis, resolved  Anemia without microcytosis, doubt iron deficiency  Nothing to suggest GI illness associated with Ankylosing spondylitis  Elevated BP    Plan:     1.  No further inpatient GI (subspecialty) follow up; Please call if needed  2. follow up with PCP: Natalie Toro MD    [facility] [address] [place] [address]  [phone]

## 2018-10-17 ENCOUNTER — CLINICAL DOCUMENTATION (OUTPATIENT)
Dept: SPIRITUAL SERVICES | Age: 63
End: 2018-10-17

## 2018-10-28 ENCOUNTER — HOSPITAL ENCOUNTER (OUTPATIENT)
Age: 63
Setting detail: SPECIMEN
Discharge: HOME | End: 2018-10-28

## 2018-10-28 DIAGNOSIS — I10 ESSENTIAL HYPERTENSION: ICD-10-CM

## 2018-10-28 PROCEDURE — 80061 LIPID PANEL: CPT

## 2018-10-28 PROCEDURE — 85027 COMPLETE CBC AUTOMATED: CPT

## 2018-10-28 PROCEDURE — 36415 COLL VENOUS BLD VENIPUNCTURE: CPT

## 2018-10-28 PROCEDURE — 80053 COMPREHEN METABOLIC PANEL: CPT

## 2018-10-29 ENCOUNTER — CLINICAL DOCUMENTATION (OUTPATIENT)
Dept: SPIRITUAL SERVICES | Age: 63
End: 2018-10-29

## 2018-10-29 LAB
A/G RATIO: 1.3 (ref 1.1–2.2)
ALBUMIN SERPL-MCNC: 4.3 G/DL (ref 3.4–5)
ALP BLD-CCNC: 123 U/L (ref 40–129)
ALT SERPL-CCNC: 20 U/L (ref 10–40)
ANION GAP SERPL CALCULATED.3IONS-SCNC: 13 MMOL/L (ref 3–16)
AST SERPL-CCNC: 18 U/L (ref 15–37)
BILIRUB SERPL-MCNC: <0.2 MG/DL (ref 0–1)
BUN BLDV-MCNC: 18 MG/DL (ref 7–20)
CALCIUM SERPL-MCNC: 9.6 MG/DL (ref 8.3–10.6)
CHLORIDE BLD-SCNC: 102 MMOL/L (ref 99–110)
CHOLESTEROL, TOTAL: 215 MG/DL (ref 0–199)
CO2: 29 MMOL/L (ref 21–32)
CREAT SERPL-MCNC: 0.8 MG/DL (ref 0.6–1.2)
GFR AFRICAN AMERICAN: >60
GFR NON-AFRICAN AMERICAN: >60
GLOBULIN: 3.2 G/DL
GLUCOSE BLD-MCNC: 98 MG/DL (ref 70–99)
HCT VFR BLD CALC: 31.1 % (ref 36–48)
HDLC SERPL-MCNC: 68 MG/DL (ref 40–60)
HEMOGLOBIN: 10.1 G/DL (ref 12–16)
LDL CHOLESTEROL CALCULATED: ABNORMAL MG/DL
LDL CHOLESTEROL DIRECT: 130 MG/DL
MCH RBC QN AUTO: 30.8 PG (ref 26–34)
MCHC RBC AUTO-ENTMCNC: 32.6 G/DL (ref 31–36)
MCV RBC AUTO: 94.6 FL (ref 80–100)
PDW BLD-RTO: 16.7 % (ref 12.4–15.4)
PLATELET # BLD: 468 K/UL (ref 135–450)
PMV BLD AUTO: 6.7 FL (ref 5–10.5)
POTASSIUM SERPL-SCNC: 4.3 MMOL/L (ref 3.5–5.1)
RBC # BLD: 3.29 M/UL (ref 4–5.2)
SODIUM BLD-SCNC: 144 MMOL/L (ref 136–145)
TOTAL PROTEIN: 7.5 G/DL (ref 6.4–8.2)
TRIGL SERPL-MCNC: 301 MG/DL (ref 0–150)
VLDLC SERPL CALC-MCNC: ABNORMAL MG/DL
WBC # BLD: 10.4 K/UL (ref 4–11)

## 2018-10-29 NOTE — PROGRESS NOTES
10/29/2018 15:47    Outpatient Spiritual Care mailed final letter to patient. [facility] will discontinue following patient at this time but will be available in the future if desired.

## 2018-11-06 ENCOUNTER — OFFICE VISIT (OUTPATIENT)
Dept: INTERNAL MEDICINE CLINIC | Age: 63
End: 2018-11-06

## 2018-11-06 VITALS
RESPIRATION RATE: 16 BRPM | BODY MASS INDEX: 21.48 KG/M2 | HEART RATE: 86 BPM | SYSTOLIC BLOOD PRESSURE: 158 MMHG | OXYGEN SATURATION: 96 % | WEIGHT: 110 LBS | DIASTOLIC BLOOD PRESSURE: 78 MMHG

## 2018-11-06 DIAGNOSIS — I10 ESSENTIAL HYPERTENSION: Primary | ICD-10-CM

## 2018-11-06 DIAGNOSIS — D64.9 NORMOCYTIC ANEMIA: ICD-10-CM

## 2018-11-06 DIAGNOSIS — R73.9 HYPERGLYCEMIA: ICD-10-CM

## 2018-11-06 DIAGNOSIS — F41.1 GENERALIZED ANXIETY DISORDER: ICD-10-CM

## 2018-11-06 DIAGNOSIS — J43.9 PULMONARY EMPHYSEMA, UNSPECIFIED EMPHYSEMA TYPE (HCC): ICD-10-CM

## 2018-11-06 RX ORDER — AMLODIPINE BESYLATE 10 MG/1
10 TABLET ORAL DAILY
Qty: 30 TABLET | Refills: 5 | Status: SHIPPED | OUTPATIENT
Start: 2018-11-06 | End: 2019-02-26 | Stop reason: SDUPTHER

## 2018-11-06 NOTE — PATIENT INSTRUCTIONS
1. Hypertension (elevated blood pressure):  Blood pressure still elevated but much better today at 158/78, goal less than 140/90. Continue taking amlodipine (Norvasc) but increase from 5 mg to 10 mg tablet once per day in the evening. 2. Generalized anxiety:  Continue taking venlafaxine (Effexor XR) 150 mg tablet ONCE per day in the morning. Continue for another four weeks on this dose with the plan to increase if needed. 3. Hyperglycemia (elevated glucose):  Hemoglobin A1c (three month average glucose level) was NORMAL 5.1% (normal 3-6%). No diabetes. 4. COPD (emphysema):  Spirometry (pulmonary function test) today shows moderately severe obstruction, with low vital capacity. Continue using Spiriva (tiotropium) capsule inhaler, ONE puff every morning. Refill at [facility]. 5. Normocytic anemia (low blood count) by CBC in November 2017. CBC showed low hemoglobin at 10.1 gm/dL. We will check iron saturation and B12 level today.       Follow-up in a month

## 2018-11-06 NOTE — PROGRESS NOTES
SUBJECTIVE:   Follow up from 10/16. She remains depressed and anxious on Effexor  mg daily. Difficulty sleeping including nightmares when she sleeps, wakes up several times and unable able to get back to sleep. October is the month  . She is walking without walking boot. Left ankle pain continues but is now chronic per Dr. Junior Ny. History of present illness:  New patient, referred by wound care. History of anxiety. History of left ankle fracture requiring surgery in [place] in 2018 with subsequent infection requiring wound care. She was followed by Dr. Inna Machado ([place]) and is unable to get her usual Neurontin and Norco for ankylosing spondylitis. She was prescribed lisinopril in the past but this resulted in reaction that required hospitalization for acute kidney injury. She is taking venlafaxine only once per day (rather than prescribed TID) due to expense. She is smoking 0.5 - 1 ppd due to anxiety and grief related to 's death. She takes care of great granddaughter, age 6. She is using albuterol inhaler 2-3 times per day. She was followed by pulmonary in [place] and prescribed LAMA and LABA but unable to afford due to loss of insurance. Ankle fracture requiring an open reduction with internal fixation in [place] (2018) followed by podiatry (Dr. Junior Ny) and is now out of walking boot.       MEDICATIONS:  amLODIPine (NORVASC) 5 MG tablet Take 1 tablet by mouth daily   venlafaxine (EFFEXOR XR) 150 MG extended release capsule Take 1 capsule by mouth daily   tiotropium (SPIRIVA HANDIHALER) 18 MCG inhalation capsule Inhale 1 capsule into the lungs daily   aspirin 81 MG chewable tablet Take 81 mg by mouth daily   Multiple Vitamins-Minerals (THERAPEUTIC MULTIVITAMIN-MINERALS) tablet Take 1 tablet by mouth daily   albuterol (PROVENTIL) (2.5 MG/3ML) 0.083% nebulizer solution Take 2.5 mg by nebulization every 6 hours as needed for Wheezing   gabapentin (NEURONTIN)

## 2018-12-04 ENCOUNTER — OFFICE VISIT (OUTPATIENT)
Dept: INTERNAL MEDICINE CLINIC | Age: 63
End: 2018-12-04

## 2018-12-04 VITALS
WEIGHT: 117 LBS | OXYGEN SATURATION: 98 % | DIASTOLIC BLOOD PRESSURE: 68 MMHG | BODY MASS INDEX: 22.85 KG/M2 | SYSTOLIC BLOOD PRESSURE: 160 MMHG | HEART RATE: 86 BPM

## 2018-12-04 DIAGNOSIS — I10 ESSENTIAL HYPERTENSION: Primary | ICD-10-CM

## 2018-12-04 DIAGNOSIS — F41.1 GENERALIZED ANXIETY DISORDER: ICD-10-CM

## 2018-12-04 DIAGNOSIS — J43.9 PULMONARY EMPHYSEMA, UNSPECIFIED EMPHYSEMA TYPE (HCC): ICD-10-CM

## 2018-12-04 DIAGNOSIS — D64.9 NORMOCYTIC ANEMIA: ICD-10-CM

## 2018-12-04 PROCEDURE — 99214 OFFICE O/P EST MOD 30 MIN: CPT | Performed by: INTERNAL MEDICINE

## 2018-12-04 RX ORDER — GUAIFENESIN 600 MG/1
600 TABLET, EXTENDED RELEASE ORAL 2 TIMES DAILY
Qty: 60 TABLET | Refills: 5 | Status: SHIPPED | OUTPATIENT
Start: 2018-12-04 | End: 2019-06-02

## 2018-12-04 RX ORDER — VENLAFAXINE HYDROCHLORIDE 75 MG/1
75 CAPSULE, EXTENDED RELEASE ORAL DAILY
Qty: 30 CAPSULE | Refills: 3 | Status: SHIPPED | OUTPATIENT
Start: 2018-12-04 | End: 2018-12-04

## 2018-12-04 NOTE — PATIENT INSTRUCTIONS
1. Hypertension (elevated blood pressure):  Blood pressure still elevated at 160/68, goal less than 140/90.  Continue taking amlodipine (Norvasc) 10 mg tablet once per day in the evening.   Depending on response to increased Effexor, we may need to add a second medication. 2. Generalized anxiety:  Continue taking venlafaxine (Effexor XR) 150 mg tablet ONCE per day in the morning. Add a 75 mg tablet every morning for a total dose of 225 mg daily. 3. Hyperglycemia (elevated glucose):  Hemoglobin A1c (three month average glucose level) was NORMAL 5.1% (normal 3-6%). No diabetes. 4. COPD (emphysema):  Spirometry (pulmonary function test) today shows moderately severe obstruction, with low vital capacity.  Work with [facility]) pharmacy to get Spiriva (tiotropium) capsule inhaler,   5. Normocytic anemia (low blood count) by CBC in November 2017. [address] showed low hemoglobin at 10.1 gm/dL. We will check iron saturation and B12 level today.       Follow-up in two weeks

## 2018-12-04 NOTE — PROGRESS NOTES
0.083% nebulizer solution Take 2.5 mg by nebulization every 6 hours as needed for Wheezing   tiotropium (SPIRIVA HANDIHALER) 18 MCG inhalation capsule Inhale 1 capsule into the lungs daily   gabapentin (NEURONTIN) 600 MG tablet Take 2 tablets by mouth every 8 hours for 30 days. .       Lab Results   Component Value Date    LABA1C 5.1 10/16/2018     Lab Results   Component Value Date    WBC 10.4 10/28/2018    HGB 10.1 (L) 10/28/2018     (H) 10/28/2018     Lab Results   Component Value Date     10/28/2018    K 4.3 10/28/2018     10/28/2018    CO2 29 10/28/2018    BUN 18 10/28/2018    CREATININE 0.8 10/28/2018    GLUCOSE 98 10/28/2018       OBJECTIVE:    BP (!) 160/68   Pulse 86   Wt 117 lb (53.1 kg)   SpO2 98%   BMI 22.85 kg/m²   HEENT:  Oropharynx clear, no lymphadenopathy,   LUNGS:  Clear and without wheezes  HEART:  Regular rhythm, no appreciable murmur  ABD:  Benign, active bowel sounds  EXT:  No edema, pulses palpable  NEURO:  No focal neurologic deficits noted. ASSESSMENT / PLAN:   1. Hypertension (elevated blood pressure):  Blood pressure still elevated at 160/68, goal less than 140/90.  Continue taking amlodipine (Norvasc) 10 mg tablet once per day in the evening.   Depending on response to increased Effexor, we may need to add a second medication. 2. Generalized anxiety:  Continue taking venlafaxine (Effexor XR) 150 mg tablet ONCE per day in the morning. Add a 75 mg tablet every morning for a total dose of 225 mg daily. 3. Hyperglycemia (elevated glucose):  Hemoglobin A1c (three month average glucose level) was NORMAL 5.1% (normal 3-6%). No diabetes. 4. COPD (emphysema):  Spirometry (pulmonary function test) today shows moderately severe obstruction, with low vital capacity.  Work with [facility]) pharmacy to get Spiriva (tiotropium) capsule inhaler,   5. Normocytic anemia (low blood count) by CBC in November 2017. [address] showed low hemoglobin at 10.1 gm/dL.   We will check iron saturation and B12 level today.       Follow-up in two weeks

## 2018-12-05 RX ORDER — VENLAFAXINE 75 MG/1
75 TABLET ORAL 2 TIMES DAILY
Qty: 90 TABLET | Refills: 3 | Status: SHIPPED | OUTPATIENT
Start: 2018-12-05 | End: 2018-12-19 | Stop reason: DRUGHIGH

## 2018-12-19 ENCOUNTER — OFFICE VISIT (OUTPATIENT)
Dept: INTERNAL MEDICINE CLINIC | Age: 63
End: 2018-12-19

## 2018-12-19 VITALS
WEIGHT: 118 LBS | HEART RATE: 72 BPM | OXYGEN SATURATION: 100 % | BODY MASS INDEX: 23.05 KG/M2 | SYSTOLIC BLOOD PRESSURE: 176 MMHG | DIASTOLIC BLOOD PRESSURE: 64 MMHG

## 2018-12-19 DIAGNOSIS — I10 ESSENTIAL HYPERTENSION: ICD-10-CM

## 2018-12-19 DIAGNOSIS — J44.9 CHRONIC OBSTRUCTIVE PULMONARY DISEASE, UNSPECIFIED COPD TYPE (HCC): Primary | ICD-10-CM

## 2018-12-19 DIAGNOSIS — F17.200 TOBACCO USE DISORDER: ICD-10-CM

## 2018-12-19 DIAGNOSIS — F32.A ANXIETY AND DEPRESSION: ICD-10-CM

## 2018-12-19 DIAGNOSIS — F41.9 ANXIETY AND DEPRESSION: ICD-10-CM

## 2018-12-19 PROCEDURE — 99213 OFFICE O/P EST LOW 20 MIN: CPT | Performed by: INTERNAL MEDICINE

## 2018-12-19 RX ORDER — VENLAFAXINE 75 MG/1
75 TABLET ORAL SEE ADMIN INSTRUCTIONS
Status: SHIPPED | COMMUNITY
Start: 2018-12-19 | End: 2019-02-19 | Stop reason: SDUPTHER

## 2018-12-19 RX ORDER — ALBUTEROL SULFATE 90 UG/1
2 AEROSOL, METERED RESPIRATORY (INHALATION) EVERY 6 HOURS PRN
COMMUNITY
Start: 2018-12-19 | End: 2019-06-11 | Stop reason: SDUPTHER

## 2018-12-19 RX ORDER — NICOTINE 21 MG/24HR
1 PATCH, TRANSDERMAL 24 HOURS TRANSDERMAL DAILY
Qty: 30 PATCH | Refills: 0 | Status: SHIPPED | OUTPATIENT
Start: 2018-12-19 | End: 2019-10-01

## 2019-02-19 DIAGNOSIS — F32.0 CURRENT MILD EPISODE OF MAJOR DEPRESSIVE DISORDER, UNSPECIFIED WHETHER RECURRENT (HCC): ICD-10-CM

## 2019-02-19 DIAGNOSIS — F41.9 ANXIETY: Primary | ICD-10-CM

## 2019-02-19 RX ORDER — VENLAFAXINE 75 MG/1
75 TABLET ORAL SEE ADMIN INSTRUCTIONS
Qty: 90 TABLET | Refills: 1 | Status: SHIPPED | OUTPATIENT
Start: 2019-02-19 | End: 2019-02-26 | Stop reason: SDUPTHER

## 2019-02-26 ENCOUNTER — OFFICE VISIT (OUTPATIENT)
Dept: INTERNAL MEDICINE CLINIC | Age: 64
End: 2019-02-26

## 2019-02-26 VITALS
SYSTOLIC BLOOD PRESSURE: 147 MMHG | DIASTOLIC BLOOD PRESSURE: 60 MMHG | HEART RATE: 83 BPM | WEIGHT: 124 LBS | BODY MASS INDEX: 24.22 KG/M2 | OXYGEN SATURATION: 100 %

## 2019-02-26 DIAGNOSIS — F32.0 CURRENT MILD EPISODE OF MAJOR DEPRESSIVE DISORDER, UNSPECIFIED WHETHER RECURRENT (HCC): ICD-10-CM

## 2019-02-26 DIAGNOSIS — J44.9 CHRONIC OBSTRUCTIVE PULMONARY DISEASE, UNSPECIFIED COPD TYPE (HCC): ICD-10-CM

## 2019-02-26 DIAGNOSIS — D64.9 NORMOCYTIC ANEMIA: ICD-10-CM

## 2019-02-26 DIAGNOSIS — F41.9 ANXIETY: ICD-10-CM

## 2019-02-26 DIAGNOSIS — I10 ESSENTIAL HYPERTENSION: Primary | ICD-10-CM

## 2019-02-26 PROCEDURE — 99214 OFFICE O/P EST MOD 30 MIN: CPT | Performed by: INTERNAL MEDICINE

## 2019-02-26 RX ORDER — AZITHROMYCIN 250 MG/1
TABLET, FILM COATED ORAL
Qty: 1 PACKET | Refills: 0 | Status: SHIPPED | OUTPATIENT
Start: 2019-02-26 | End: 2019-10-01

## 2019-02-26 RX ORDER — AMLODIPINE BESYLATE 10 MG/1
10 TABLET ORAL DAILY
Qty: 30 TABLET | Refills: 5 | Status: SHIPPED | OUTPATIENT
Start: 2019-02-26 | End: 2019-06-11 | Stop reason: SDUPTHER

## 2019-02-26 RX ORDER — VENLAFAXINE 75 MG/1
75 TABLET ORAL SEE ADMIN INSTRUCTIONS
Qty: 90 TABLET | Refills: 1 | Status: SHIPPED | OUTPATIENT
Start: 2019-02-26 | End: 2019-06-11 | Stop reason: SDUPTHER

## 2019-06-11 ENCOUNTER — OFFICE VISIT (OUTPATIENT)
Dept: INTERNAL MEDICINE CLINIC | Age: 64
End: 2019-06-11

## 2019-06-11 VITALS
DIASTOLIC BLOOD PRESSURE: 72 MMHG | HEIGHT: 61 IN | OXYGEN SATURATION: 98 % | WEIGHT: 127 LBS | SYSTOLIC BLOOD PRESSURE: 130 MMHG | HEART RATE: 74 BPM | BODY MASS INDEX: 23.98 KG/M2

## 2019-06-11 DIAGNOSIS — F41.9 ANXIETY: ICD-10-CM

## 2019-06-11 DIAGNOSIS — I10 ESSENTIAL HYPERTENSION: ICD-10-CM

## 2019-06-11 DIAGNOSIS — J44.9 CHRONIC OBSTRUCTIVE PULMONARY DISEASE, UNSPECIFIED COPD TYPE (HCC): ICD-10-CM

## 2019-06-11 DIAGNOSIS — F32.0 CURRENT MILD EPISODE OF MAJOR DEPRESSIVE DISORDER, UNSPECIFIED WHETHER RECURRENT (HCC): ICD-10-CM

## 2019-06-11 PROCEDURE — 99214 OFFICE O/P EST MOD 30 MIN: CPT | Performed by: INTERNAL MEDICINE

## 2019-06-11 RX ORDER — AZITHROMYCIN 250 MG/1
250 TABLET, FILM COATED ORAL SEE ADMIN INSTRUCTIONS
Qty: 6 TABLET | Refills: 0 | Status: SHIPPED | OUTPATIENT
Start: 2019-06-11 | End: 2019-06-16

## 2019-06-11 RX ORDER — VENLAFAXINE 75 MG/1
75 TABLET ORAL SEE ADMIN INSTRUCTIONS
Qty: 90 TABLET | Refills: 3 | Status: SHIPPED | OUTPATIENT
Start: 2019-06-11 | End: 2019-09-25 | Stop reason: SDUPTHER

## 2019-06-11 RX ORDER — PREDNISONE 20 MG/1
40 TABLET ORAL DAILY
Qty: 6 TABLET | Refills: 0 | Status: SHIPPED | OUTPATIENT
Start: 2019-06-11 | End: 2019-06-14

## 2019-06-11 RX ORDER — ALBUTEROL SULFATE 90 UG/1
2 AEROSOL, METERED RESPIRATORY (INHALATION) EVERY 6 HOURS PRN
Qty: 1 INHALER | Refills: 3 | Status: SHIPPED | OUTPATIENT
Start: 2019-06-11

## 2019-06-11 RX ORDER — AMLODIPINE BESYLATE 10 MG/1
10 TABLET ORAL DAILY
Qty: 30 TABLET | Refills: 5 | Status: SHIPPED | OUTPATIENT
Start: 2019-06-11 | End: 2019-11-19 | Stop reason: SDUPTHER

## 2019-06-11 NOTE — PROGRESS NOTES
SUBJECTIVE:   Follow up from 2/26/19, coughing spells and dyspnea related to COPD. Using albuterol nebulizer several times per day. Great granddaughter diagnosed with Strep pharyngitis treated with amoxicillin. She thinks she has another left ankle infection. Smoking about 3 cigs/day.       History of present illness:  Referred by wound care.  History of anxiety.  History of left ankle fracture requiring surgery in [place] in June 2018 with subsequent infection requiring wound care.  She was followed by Dr. Ronaldo Jaimes ([facility]) and is unable to get her usual Neurontin and Norco for ankylosing spondylitis.  She was prescribed lisinopril in the past but this resulted in reaction that required hospitalization for acute kidney injury.  She is taking venlafaxine only once per day (rather than prescribed TID) due to expense.  She is smoking 0.5 - 1 ppd due to anxiety and grief related to 's death.  She takes care of great granddaughter, age 6.  She is using albuterol inhaler 2-3 times per day. Cally oCsmo was followed by pulmonary in [place] and prescribed LAMA and LABA but unable to afford due to loss of insurance.   Ankle fracture requiring an open reduction with internal fixation in [place] (July 2018) followed by podiatry (Dr. Yandy Prather) and is now out of walking boot.       MEDICATIONS:  Medication Sig    amLODIPine (NORVASC) 10 MG tablet Take 1 tablet by mouth daily    venlafaxine (EFFEXOR) 75 MG tablet Take 1 tablet by mouth See Admin Instructions Takes 150 mg q AM, 75 mg q PM    tiotropium (SPIRIVA HANDIHALER) 18 MCG inhalation capsule Inhale 1 capsule into the lungs daily    albuterol sulfate  (90 Base) MCG/ACT inhaler Inhale 2 puffs into the lungs every 6 hours as needed for Wheezing    aspirin 81 MG chewable tablet Take 81 mg by mouth daily    Multiple Vitamins-Minerals (THERAPEUTIC MULTIVITAMIN-MINERALS) tablet Take 1 tablet by mouth daily    albuterol (PROVENTIL) (2.5 MG/3ML) 0.083% Pharm[facility] for inhaler.      Follow-up in two weeks

## 2019-06-11 NOTE — PATIENT INSTRUCTIONS
1. Acute exacerbation of chronic bronchitis and COPD:   Take azithromycin (Z-tanner) 500 mg (two tablets) today and then 250 mg (one tablet) daily to complete five days. Take Prednisone 40 mg (two tablets) daily for three days. 2. Hypertension (elevated blood pressure):  Blood pressure good today at 130/72, goal less than 140/90.  Continue taking amlodipine (Norvasc) 10 mg tablet once per day in the evening.     3. Generalized anxiety:  Continue taking venlafaxine (Effexor XR) 150 mg and 75 mg tablet (225 mg total) ONCE per day in the morning. 4. COPD (emphysema):  Spirometry (pulmonary function test) showed moderately severe obstruction, with low vital capacity. We will need to work though [address] [place] [address] for inhaler.      Follow-up in two weeks

## 2019-06-25 ENCOUNTER — OFFICE VISIT (OUTPATIENT)
Dept: INTERNAL MEDICINE CLINIC | Age: 64
End: 2019-06-25

## 2019-06-25 VITALS
BODY MASS INDEX: 24.17 KG/M2 | OXYGEN SATURATION: 98 % | WEIGHT: 128 LBS | DIASTOLIC BLOOD PRESSURE: 81 MMHG | SYSTOLIC BLOOD PRESSURE: 145 MMHG | HEIGHT: 61 IN | HEART RATE: 91 BPM

## 2019-06-25 DIAGNOSIS — F32.A ANXIETY AND DEPRESSION: ICD-10-CM

## 2019-06-25 DIAGNOSIS — F41.9 ANXIETY AND DEPRESSION: ICD-10-CM

## 2019-06-25 DIAGNOSIS — I10 ESSENTIAL HYPERTENSION: Primary | ICD-10-CM

## 2019-06-25 DIAGNOSIS — J43.9 PULMONARY EMPHYSEMA, UNSPECIFIED EMPHYSEMA TYPE (HCC): ICD-10-CM

## 2019-06-25 PROCEDURE — 99214 OFFICE O/P EST MOD 30 MIN: CPT | Performed by: INTERNAL MEDICINE

## 2019-06-25 NOTE — PROGRESS NOTES
SUBJECTIVE:   Follow up from 6/11 for acute exacerbation of chronic bronchitis and COPD treated with azithromycin and three days of Prednisone. She has started feeling congested again. She was able to get Spiriva inhaler and albuterol nebulizer through the Pharmacy of [address] [place] [address]. Using Spiriva every morning and albuterol nebulizer 2-4 times per week. She has both the albuterol inhaler and nebulizer. She uses the nebulizer at night occasionally. MEDICATIONS:  amLODIPine (NORVASC) 10 MG tablet Take 1 tablet by mouth daily   venlafaxine (EFFEXOR) 75 MG tablet Take 1 tablet by mouth See Admin Instructions Takes 150 mg q AM, 75 mg q PM   tiotropium (SPIRIVA HANDIHALER) 18 MCG inhalation capsule Inhale 1 capsule into the lungs daily   albuterol sulfate  (90 Base) MCG/ACT inhaler Inhale 2 puffs into the lungs every 6 hours as needed for Wheezing   azithromycin (ZITHROMAX Z-LIV) 250 MG tablet Take 2 tablets (500 mg) on Day 1, and then take 1 tablet (250 mg) on days 2 through 5.   aspirin 81 MG chewable tablet Take 81 mg by mouth daily   Multiple Vitamins-Minerals (THERAPEUTIC MULTIVITAMIN-MINERALS) tablet Take 1 tablet by mouth daily   albuterol (PROVENTIL) (2.5 MG/3ML) 0.083% nebulizer solution Take 2.5 mg by nebulization every 6 hours as needed for Wheezing   nicotine (NICODERM CQ) 14 MG/24HR Place 1 patch onto the skin daily   gabapentin (NEURONTIN) 600 MG tablet Take 2 tablets by mouth every 8 hours for 30 days. .         Lab Results   Component Value Date    LABA1C 5.1 10/16/2018     Lab Results   Component Value Date    WBC 10.4 10/28/2018    HGB 10.1 (L) 10/28/2018     (H) 10/28/2018    MCV 94.6 10/28/2018     Lab Results   Component Value Date     10/28/2018    K 4.3 10/28/2018     10/28/2018    CO2 29 10/28/2018    BUN 18 10/28/2018    CREATININE 0.8 10/28/2018    GLUCOSE 98 10/28/2018       OBJECTIVE:    BP (!) 145/81 (Site: Left Upper Arm, Position: Sitting, Cuff Size: Medium Adult)   Pulse 91   Ht 5' 1\" (1.549 m)   Wt 128 lb (58.1 kg)   SpO2 98%   BMI 24.19 kg/m²   HEENT:  Oropharynx clear, no lymphadenopathy,   LUNGS:  Clear and without wheezes, reasonable overall air movement  HEART:  Regular rhythm, no appreciable murmur  ABD:  Benign, active bowel sounds  EXT:  No edema, pulses palpable  NEURO:  No focal neurologic deficits noted. ASSESSMENT / PLAN:   1. Hypertension (elevated blood pressure):  Blood pressure borderline elevated today at 145/81, goal less than 140/90.  Continue taking amlodipine (Norvasc) 10 mg tablet once per day in the evening.     2. Generalized anxiety:  Continue taking venlafaxine (Effexor XR) 150 mg and 75 mg tablet (225 mg total) ONCE per day in the morning. 3. COPD (emphysema):  Spirometry (pulmonary function test) showed moderately severe obstruction, with low vital capacity.  Continue using the tiotropium (Spiriva) capsule inhaler, ONE puff every morning. Use the albuterol nebulizer as needed for significant wheezing or shortness of breath. Oxygen saturation good at 98%. 4. Drink plenty of water.   The urine should be dilute not concentrated.          Follow-up in two months

## 2019-06-25 NOTE — PATIENT INSTRUCTIONS
1. Hypertension (elevated blood pressure):  Blood pressure borderline elevated today at 145/81, goal less than 140/90.  Continue taking amlodipine (Norvasc) 10 mg tablet once per day in the evening.     2. Generalized anxiety:  Continue taking venlafaxine (Effexor XR) 150 mg and 75 mg tablet (225 mg total) ONCE per day in the morning. 3. COPD (emphysema):  Spirometry (pulmonary function test) showed moderately severe obstruction, with low vital capacity.  Continue using the tiotropium (Spiriva) capsule inhaler, ONE puff every morning. Use the albuterol nebulizer as needed for significant wheezing or shortness of breath. Oxygen saturation good at 98%. 4. Drink plenty of water.   The urine should be dilute not concentrated.          Follow-up in two months

## 2019-08-29 ENCOUNTER — TELEPHONE (OUTPATIENT)
Dept: INTERNAL MEDICINE CLINIC | Age: 64
End: 2019-08-29

## 2019-08-29 DIAGNOSIS — I10 ESSENTIAL HYPERTENSION: ICD-10-CM

## 2019-08-29 RX ORDER — AMLODIPINE BESYLATE 5 MG/1
5 TABLET ORAL DAILY
Qty: 30 TABLET | Refills: 5 | Status: SHIPPED | OUTPATIENT
Start: 2019-08-29 | End: 2019-10-01 | Stop reason: ALTCHOICE

## 2019-09-25 ENCOUNTER — OFFICE VISIT (OUTPATIENT)
Dept: INTERNAL MEDICINE CLINIC | Age: 64
End: 2019-09-25

## 2019-09-25 VITALS
HEART RATE: 80 BPM | OXYGEN SATURATION: 97 % | DIASTOLIC BLOOD PRESSURE: 76 MMHG | SYSTOLIC BLOOD PRESSURE: 156 MMHG | BODY MASS INDEX: 23.43 KG/M2 | WEIGHT: 124 LBS

## 2019-09-25 DIAGNOSIS — F41.9 ANXIETY AND DEPRESSION: ICD-10-CM

## 2019-09-25 DIAGNOSIS — J44.9 CHRONIC OBSTRUCTIVE PULMONARY DISEASE, UNSPECIFIED COPD TYPE (HCC): ICD-10-CM

## 2019-09-25 DIAGNOSIS — F41.9 ANXIETY: ICD-10-CM

## 2019-09-25 DIAGNOSIS — I10 ESSENTIAL HYPERTENSION: ICD-10-CM

## 2019-09-25 DIAGNOSIS — L03.119 ANKLE CELLULITIS: Primary | ICD-10-CM

## 2019-09-25 DIAGNOSIS — F32.A ANXIETY AND DEPRESSION: ICD-10-CM

## 2019-09-25 DIAGNOSIS — F32.0 CURRENT MILD EPISODE OF MAJOR DEPRESSIVE DISORDER, UNSPECIFIED WHETHER RECURRENT (HCC): ICD-10-CM

## 2019-09-25 PROCEDURE — 99214 OFFICE O/P EST MOD 30 MIN: CPT | Performed by: INTERNAL MEDICINE

## 2019-09-25 RX ORDER — CEPHALEXIN 500 MG/1
500 CAPSULE ORAL 2 TIMES DAILY
Qty: 20 CAPSULE | Refills: 0 | Status: SHIPPED | OUTPATIENT
Start: 2019-09-25 | End: 2019-10-05

## 2019-09-25 RX ORDER — DOXYCYCLINE HYCLATE 100 MG
100 TABLET ORAL 2 TIMES DAILY
Qty: 10 TABLET | Refills: 0 | Status: SHIPPED | OUTPATIENT
Start: 2019-09-25 | End: 2019-10-01

## 2019-09-25 RX ORDER — VENLAFAXINE 75 MG/1
75 TABLET ORAL SEE ADMIN INSTRUCTIONS
Qty: 90 TABLET | Refills: 3 | Status: SHIPPED | OUTPATIENT
Start: 2019-09-25 | End: 2020-03-10 | Stop reason: SDUPTHER

## 2019-09-25 NOTE — PROGRESS NOTES
Wt 124 lb (56.2 kg)   SpO2 97%   BMI 23.43 kg/m²   HEENT:  Oropharynx clear, no lymphadenopathy,   LUNGS:  Clear and without wheezes  HEART:  Regular rhythm, no appreciable murmur  ABD:  Benign, active bowel sounds  EXT:  No edema, pulses palpable  NEURO:  No focal neurologic deficits noted. ASSESSMENT / PLAN:   1. Left ankle cellulitis with possible abscess: Take doxycycline 100 mg twice per day for 10 days and cephalexin (Keflex) 500 mg twice per day for 10 days. 2. Hypertension (elevated blood pressure):  Blood pressure elevated today at 156/81, goal less than 140/90.  Continue taking amlodipine (Norvasc) 10 mg tablet once per day in the evening.     3. Generalized anxiety:  Continue taking venlafaxine (Effexor XR) 150 mg in the morning and 75 mg in the evening (225 mg per day). 4. COPD (emphysema) with moderately severe obstruction and low vital capacity by spirometry (10/16/18).   Continue using the tiotropium (Spiriva) capsule inhaler, ONE puff every morning. Use the albuterol nebulizer as needed for significant wheezing or shortness of breath. Oxygen saturation good at 97%. 5. Ankylosing spondylitis:  We cannot prescribe gabapentin at the clinic.         Follow-up in two weeks  Schedule appointment with wound care clinic

## 2019-10-01 ENCOUNTER — HOSPITAL ENCOUNTER (OUTPATIENT)
Dept: GENERAL RADIOLOGY | Age: 64
Discharge: HOME | End: 2019-10-01

## 2019-10-01 ENCOUNTER — HOSPITAL ENCOUNTER (OUTPATIENT)
Dept: WOUND CARE | Age: 64
Discharge: HOME | End: 2019-10-01

## 2019-10-01 VITALS
TEMPERATURE: 98.5 F | RESPIRATION RATE: 20 BRPM | WEIGHT: 130 LBS | HEART RATE: 72 BPM | HEIGHT: 60 IN | DIASTOLIC BLOOD PRESSURE: 70 MMHG | SYSTOLIC BLOOD PRESSURE: 168 MMHG | BODY MASS INDEX: 25.52 KG/M2

## 2019-10-01 DIAGNOSIS — T81.41XA ABSCESS INVOLVING SUTURE: ICD-10-CM

## 2019-10-01 DIAGNOSIS — T81.41XA ABSCESS INVOLVING SUTURE: Primary | ICD-10-CM

## 2019-10-01 DIAGNOSIS — S91.002A OPEN WOUND OF LEFT ANKLE, INITIAL ENCOUNTER: ICD-10-CM

## 2019-10-01 PROCEDURE — 73610 X-RAY EXAM OF ANKLE: CPT

## 2019-10-01 PROCEDURE — 99213 OFFICE O/P EST LOW 20 MIN: CPT

## 2019-10-01 PROCEDURE — 99243 OFF/OP CNSLTJ NEW/EST LOW 30: CPT | Performed by: NURSE PRACTITIONER

## 2019-10-01 RX ORDER — LIDOCAINE 40 MG/G
CREAM TOPICAL ONCE
Status: DISCONTINUED | OUTPATIENT
Start: 2019-10-01 | End: 2019-10-02 | Stop reason: HOSPADM

## 2019-10-01 ASSESSMENT — PAIN DESCRIPTION - LOCATION: LOCATION: ANKLE

## 2019-10-01 ASSESSMENT — PAIN DESCRIPTION - PAIN TYPE: TYPE: CHRONIC PAIN

## 2019-10-01 ASSESSMENT — PAIN SCALES - GENERAL: PAINLEVEL_OUTOF10: 8

## 2019-10-01 ASSESSMENT — PAIN DESCRIPTION - ONSET: ONSET: ON-GOING

## 2019-10-01 ASSESSMENT — PAIN DESCRIPTION - DESCRIPTORS: DESCRIPTORS: BURNING;STABBING

## 2019-10-01 ASSESSMENT — PAIN DESCRIPTION - ORIENTATION: ORIENTATION: LEFT

## 2019-10-01 ASSESSMENT — PAIN DESCRIPTION - PROGRESSION: CLINICAL_PROGRESSION: NOT CHANGED

## 2019-10-01 ASSESSMENT — PAIN DESCRIPTION - FREQUENCY: FREQUENCY: CONTINUOUS

## 2019-10-03 ENCOUNTER — TELEPHONE (OUTPATIENT)
Dept: WOUND CARE | Age: 64
End: 2019-10-03

## 2019-10-03 PROBLEM — S91.002A OPEN WOUND OF LEFT ANKLE: Status: ACTIVE | Noted: 2019-10-03

## 2019-10-08 ENCOUNTER — OFFICE VISIT (OUTPATIENT)
Dept: ORTHOPEDIC SURGERY | Age: 64
End: 2019-10-08

## 2019-10-08 VITALS — WEIGHT: 130.07 LBS | HEIGHT: 60 IN | BODY MASS INDEX: 25.54 KG/M2

## 2019-10-08 DIAGNOSIS — T81.41XA ABSCESS INVOLVING SUTURE: Primary | ICD-10-CM

## 2019-10-08 PROCEDURE — G9016 DEMO-SMOKING CESSATION COUN: HCPCS | Performed by: ORTHOPAEDIC SURGERY

## 2019-10-08 PROCEDURE — 99213 OFFICE O/P EST LOW 20 MIN: CPT | Performed by: ORTHOPAEDIC SURGERY

## 2019-10-15 ENCOUNTER — OFFICE VISIT (OUTPATIENT)
Dept: INTERNAL MEDICINE CLINIC | Age: 64
End: 2019-10-15

## 2019-10-15 VITALS
HEART RATE: 82 BPM | SYSTOLIC BLOOD PRESSURE: 146 MMHG | HEIGHT: 61 IN | OXYGEN SATURATION: 99 % | WEIGHT: 125 LBS | DIASTOLIC BLOOD PRESSURE: 71 MMHG | BODY MASS INDEX: 23.6 KG/M2

## 2019-10-15 DIAGNOSIS — J44.9 CHRONIC OBSTRUCTIVE PULMONARY DISEASE, UNSPECIFIED COPD TYPE (HCC): ICD-10-CM

## 2019-10-15 DIAGNOSIS — L97.321 SKIN ULCER OF LEFT ANKLE, LIMITED TO BREAKDOWN OF SKIN (HCC): Primary | ICD-10-CM

## 2019-10-15 DIAGNOSIS — I10 ESSENTIAL HYPERTENSION: ICD-10-CM

## 2019-10-15 DIAGNOSIS — F17.200 TOBACCO USE DISORDER: ICD-10-CM

## 2019-10-15 DIAGNOSIS — F41.1 GENERALIZED ANXIETY DISORDER: ICD-10-CM

## 2019-10-15 PROCEDURE — 99214 OFFICE O/P EST MOD 30 MIN: CPT | Performed by: INTERNAL MEDICINE

## 2019-10-15 RX ORDER — DOXYCYCLINE HYCLATE 100 MG
100 TABLET ORAL 2 TIMES DAILY
Qty: 60 TABLET | Refills: 0 | Status: SHIPPED | OUTPATIENT
Start: 2019-10-15 | End: 2019-11-19 | Stop reason: SDUPTHER

## 2019-11-19 ENCOUNTER — OFFICE VISIT (OUTPATIENT)
Dept: INTERNAL MEDICINE CLINIC | Age: 64
End: 2019-11-19

## 2019-11-19 VITALS
WEIGHT: 125 LBS | SYSTOLIC BLOOD PRESSURE: 132 MMHG | OXYGEN SATURATION: 96 % | HEART RATE: 80 BPM | DIASTOLIC BLOOD PRESSURE: 75 MMHG | BODY MASS INDEX: 23.62 KG/M2

## 2019-11-19 DIAGNOSIS — J43.9 PULMONARY EMPHYSEMA, UNSPECIFIED EMPHYSEMA TYPE (HCC): ICD-10-CM

## 2019-11-19 DIAGNOSIS — F41.1 GENERALIZED ANXIETY DISORDER: ICD-10-CM

## 2019-11-19 DIAGNOSIS — I10 ESSENTIAL HYPERTENSION: ICD-10-CM

## 2019-11-19 DIAGNOSIS — L97.321 SKIN ULCER OF LEFT ANKLE, LIMITED TO BREAKDOWN OF SKIN (HCC): Primary | ICD-10-CM

## 2019-11-19 DIAGNOSIS — F17.200 TOBACCO USE DISORDER: ICD-10-CM

## 2019-11-19 PROCEDURE — 90686 IIV4 VACC NO PRSV 0.5 ML IM: CPT | Performed by: INTERNAL MEDICINE

## 2019-11-19 PROCEDURE — 90471 IMMUNIZATION ADMIN: CPT | Performed by: INTERNAL MEDICINE

## 2019-11-19 PROCEDURE — 99214 OFFICE O/P EST MOD 30 MIN: CPT | Performed by: INTERNAL MEDICINE

## 2019-11-19 RX ORDER — AMLODIPINE BESYLATE 10 MG/1
10 TABLET ORAL DAILY
Qty: 30 TABLET | Refills: 5 | Status: SHIPPED | OUTPATIENT
Start: 2019-11-19 | End: 2019-12-03 | Stop reason: SDUPTHER

## 2019-11-19 RX ORDER — DOXYCYCLINE HYCLATE 100 MG
100 TABLET ORAL 2 TIMES DAILY
Qty: 60 TABLET | Refills: 1 | Status: SHIPPED | OUTPATIENT
Start: 2019-11-19 | End: 2020-01-07 | Stop reason: SDUPTHER

## 2019-12-03 DIAGNOSIS — I10 ESSENTIAL HYPERTENSION: ICD-10-CM

## 2019-12-03 RX ORDER — AMLODIPINE BESYLATE 10 MG/1
10 TABLET ORAL DAILY
Qty: 30 TABLET | Refills: 5 | Status: SHIPPED | OUTPATIENT
Start: 2019-12-03 | End: 2020-01-07

## 2020-01-07 ENCOUNTER — OFFICE VISIT (OUTPATIENT)
Dept: INTERNAL MEDICINE CLINIC | Age: 65
End: 2020-01-07

## 2020-01-07 ENCOUNTER — COMMUNITY OUTREACH (OUTPATIENT)
Dept: OTHER | Age: 65
End: 2020-01-07

## 2020-01-07 VITALS
WEIGHT: 127 LBS | BODY MASS INDEX: 24 KG/M2 | SYSTOLIC BLOOD PRESSURE: 134 MMHG | HEART RATE: 84 BPM | OXYGEN SATURATION: 95 % | DIASTOLIC BLOOD PRESSURE: 83 MMHG

## 2020-01-07 PROCEDURE — 99214 OFFICE O/P EST MOD 30 MIN: CPT | Performed by: INTERNAL MEDICINE

## 2020-01-07 RX ORDER — AMLODIPINE BESYLATE 5 MG/1
10 TABLET ORAL DAILY
Qty: 60 TABLET | Refills: 5 | Status: SHIPPED | OUTPATIENT
Start: 2020-01-07 | End: 2020-05-26 | Stop reason: SDUPTHER

## 2020-01-07 RX ORDER — DOXYCYCLINE HYCLATE 100 MG
100 TABLET ORAL 2 TIMES DAILY
Qty: 60 TABLET | Refills: 1 | Status: SHIPPED | OUTPATIENT
Start: 2020-01-07

## 2020-01-07 NOTE — PROGRESS NOTES
[facility]-SW met with patient as referred by Oscar Reza and patient discussed that she applied for Medicaid on 10/23/2019 but has not heard anything. SW and patient discussed current household and income circumstances. SW and patient discussed that patient is most likely not eligible for Medicaid due to income. SW and patient discussed financial assistance through [facility] ([facility]) for open accounts and upcoming surgery. Patient to contact surgeon to discuss his as an option and then will follow up with SW to complete financial assistance applications needed. Patient voiced understanding. SW did explain that patient would accrue bills from outside of [facility] with surgery. Patient to follow up with SW when needed.

## 2020-01-09 ENCOUNTER — COMMUNITY OUTREACH (OUTPATIENT)
Dept: OTHER | Age: 65
End: 2020-01-09

## 2020-01-09 ENCOUNTER — TELEPHONE (OUTPATIENT)
Dept: ORTHOPEDIC SURGERY | Age: 65
End: 2020-01-09

## 2020-01-17 ENCOUNTER — COMMUNITY OUTREACH (OUTPATIENT)
Dept: OTHER | Age: 65
End: 2020-01-17

## 2020-01-21 ENCOUNTER — COMMUNITY OUTREACH (OUTPATIENT)
Dept: OTHER | Age: 65
End: 2020-01-21

## 2020-01-22 ENCOUNTER — COMMUNITY OUTREACH (OUTPATIENT)
Dept: OTHER | Age: 65
End: 2020-01-22

## 2020-01-22 NOTE — PROGRESS NOTES
PRAKASH-CECELIA spoke with patient on this date who reports scheduling surgery for 2/3/2020. SW and patient scheduled appointment for after next doctor appointment on 1/28/2020 to complete financial assistance applications for open accounts and upcoming surgery. SW reviewed verifications needed for applications. Patient voiced understanding.

## 2020-01-27 NOTE — PROGRESS NOTES
Obstructive Sleep Apnea (ROSHNI) Screening     Patient:  Oscar Willams    YOB: 1955      Medical Record #:  [id number]                     Date:  1/27/2020     1. Are you a loud and/or regular snorer? [x]  Yes       [] No    2. Have you been observed to gasp or stop breathing during sleep? []  Yes       [x] No    3. Do you feel tired or groggy upon awakening or do you awaken with a headache?           []  Yes       [x] No    4. Are you often tired or fatigued during the wake time hours? [x]  Yes       [] No    5. Do you fall asleep sitting, reading, watching TV or driving? []  Yes       [x] No    6. Do you often have problems with memory or concentration? []  Yes       [x] No    **If patient's score is ? 3 they are considered high risk for ROSHNI. Notify the anesthesiologist of the high risk and document in focus note. Note:  If the patient's BMI is more than 35 kg m¯² , has neck circumference > 40 cm, and/or high blood pressure the risk is greater (© American Sleep Apnea Association, 2006).

## 2020-01-28 ENCOUNTER — COMMUNITY OUTREACH (OUTPATIENT)
Dept: OTHER | Age: 65
End: 2020-01-28

## 2020-01-28 ENCOUNTER — OFFICE VISIT (OUTPATIENT)
Dept: INTERNAL MEDICINE CLINIC | Age: 65
End: 2020-01-28

## 2020-01-28 VITALS
HEIGHT: 61 IN | OXYGEN SATURATION: 97 % | SYSTOLIC BLOOD PRESSURE: 144 MMHG | DIASTOLIC BLOOD PRESSURE: 75 MMHG | BODY MASS INDEX: 23.79 KG/M2 | HEART RATE: 90 BPM | WEIGHT: 126 LBS

## 2020-01-28 PROCEDURE — 93000 ELECTROCARDIOGRAM COMPLETE: CPT | Performed by: INTERNAL MEDICINE

## 2020-01-28 PROCEDURE — 99214 OFFICE O/P EST MOD 30 MIN: CPT | Performed by: INTERNAL MEDICINE

## 2020-01-28 NOTE — PROGRESS NOTES
PP-SW met with patient as scheduled. SW and patient discussed financial assistance for previous accounts and upcoming surgery. SW and patient completed all needed financial assistance applications. Patient provided needed verifications. SW to send in applications. Patient voiced understanding. Patient to contact SW if further assistance is needed before surgery. SW and patient discussed [facility] enrollment after surgery if needed.

## 2020-01-28 NOTE — PROGRESS NOTES
SUBJECTIVE:   Pre-op for left ankle surgery with Dr. Ehsan Anthony on 2/3/2020. Left ankle recurrent infection from plate, which needs to be removed with antibiotic beads (Dr. Ehsan Anthony).  [address] for plate removal due to possible osteomyelitis.  Smoking cessation discussed. Kevin Mosqueda is followed by wound care clinic and currently on doxycycline suppressive antibiotic therapy.  She has dificulty getting Spiriva and will receive through the [facility]. Continues on doxycycline 100 mg BID. Continues to use Spiriva (year supply from the ). She is using albuterol nebulizer less than 2-4 times per day.      MEDICATIONS:  doxycycline hyclate (VIBRA-TABS) 100 MG tablet Take 1 tablet by mouth 2 times daily   amLODIPine (NORVASC) 5 MG tablet Take 2 tablets by mouth daily   venlafaxine (EFFEXOR) 75 MG tablet Take 1 tablet by mouth See Admin Instructions Takes 150 mg q AM, 75 mg q PM   albuterol sulfate  (90 Base) MCG/ACT inhaler Inhale 2 puffs into the lungs every 6 hours as needed for Wheezing   aspirin 81 MG chewable tablet Take 81 mg by mouth daily   Multiple Vitamins-Minerals (THERAPEUTIC MULTIVITAMIN-MINERALS) tablet Take 1 tablet by mouth daily   albuterol (PROVENTIL) (2.5 MG/3ML) 0.083% nebulizer solution Take 2.5 mg by nebulization every 6 hours as needed for Wheezing   tiotropium (SPIRIVA HANDIHALER) 18 MCG inhalation capsule Inhale 1 capsule into the lungs daily   gabapentin (NEURONTIN) 600 MG tablet Take 2 tablets by mouth every 8 hours for 30 days. .         Lab Results   Component Value Date    LABA1C 5.1 10/16/2018     Lab Results   Component Value Date    WBC 10.4 10/28/2018    HGB 10.1 (L) 10/28/2018     (H) 10/28/2018    MCV 94.6 10/28/2018     Lab Results   Component Value Date     10/28/2018    K 4.3 10/28/2018     10/28/2018    CO2 29 10/28/2018    BUN 18 10/28/2018    CREATININE 0.8 10/28/2018    GLUCOSE 98 10/28/2018     EKG: Normal sinus rhythm with negative precordial T-waves,

## 2020-01-31 ENCOUNTER — ANESTHESIA EVENT (OUTPATIENT)
Dept: OPERATING ROOM | Age: 65
End: 2020-01-31

## 2020-02-01 NOTE — ANESTHESIA PRE PROCEDURE
left ankle S91.002A       Past Medical History:        Diagnosis Date    [facility]) ([facility] 11/26/2017    [facility]ylosing sp[facility]ylitis ([facility]     Bronchitis     Hypertension     Rotator cuff tear 12/11/2015    [facility]     Shingles     Surgical wound dehiscence, initial encounter 9/11/2018       Past Surgical History:        Procedure Laterality Date    ANKLE FRACTURE SURGERY Left 06/25/2018    Dr. Fredi Tellez, [facility]    COLONOSCOPY  03/24/2016    \"Small Rectal polyp\" and Diverticulosis    COLONOSCOPY  7158    Date uncertain    COLONOSCOPY  6689    Date Uncertain    COLONOSCOPY  4530    Date Uncertain    FOOT SURGERY Right     as a child    TUBAL LIGATION         Social History:    Social History     Tobacco Use    Smoking status: Current Every Day Smoker     Packs/day: 0.25     Years: 40.00     Pack years: 10.00    Smokeless tobacco: Never Used   Substance Use Topics    Alcohol use: No                                Ready to quit: Yes  Counseling given: Yes      Vital Signs (Current):   Vitals:    01/27/20 1410   Weight: 127 lb (57.6 kg)   Height: 5' 1\" (1.549 m)                                              BP Readings from Last 3 Encounters:   01/28/20 (!) 144/75   01/07/20 134/83   11/19/19 132/75       NPO Status:                                                                                 BMI:   Wt Readings from Last 3 Encounters:   01/28/20 126 lb (57.2 kg)   01/07/20 127 lb (57.6 kg)   11/19/19 125 lb (56.7 kg)     Body mass index is 24 kg/m².     CBC:   Lab Results   Component Value Date    WBC 10.4 10/28/2018    RBC 3.29 10/28/2018    HGB 10.1 10/28/2018    HCT 31.1 10/28/2018    MCV 94.6 10/28/2018    RDW 16.7 10/28/2018     10/28/2018       CMP:   Lab Results   Component Value Date     10/28/2018    K 4.3 10/28/2018     10/28/2018    CO2 29 10/28/2018    BUN 18 10/28/2018    CREATININE 0.8 10/28/2018    GFRAA >60 10/28/2018 (57.6 kg) 127 lb (57.6 kg)   Height: 5' 1\" (1.549 m) 5' 1\" (1.549 m)       Allergies   Allergen Reactions    Lisinopril Other (See Comments) and Shortness Of Breath     Cough, SOB  Cough, SOB    Nabumetone Swelling     Other reaction(s): Unknown (specify in comments)    Propoxyphene Swelling     Other reaction(s): Unknown (specify in comments)    Sulfa Antibiotics Swelling    Nickel Rash       Social History     Tobacco Use    Smoking status: Current Every Day Smoker     Packs/day: 0.25     Years: 40.00     Pack years: 10.00    Smokeless tobacco: Never Used   Substance Use Topics    Alcohol use: No       LABS:    CBC  Lab Results   Component Value Date/Time    WBC 10.4 10/28/2018 03:00 PM    HGB 10.1 (L) 10/28/2018 03:00 PM    HCT 31.1 (L) 10/28/2018 03:00 PM     (H) 10/28/2018 03:00 PM     RENAL  Lab Results   Component Value Date/Time     10/28/2018 03:00 PM    K 4.3 10/28/2018 03:00 PM     10/28/2018 03:00 PM    CO2 29 10/28/2018 03:00 PM    BUN 18 10/28/2018 03:00 PM    CREATININE 0.8 10/28/2018 03:00 PM    GLUCOSE 98 10/28/2018 03:00 PM     COAGS  Lab Results   Component Value Date/Time    PROTIME 11.6 11/26/2017 03:45 PM    INR 1.03 11/26/2017 03:45 PM       Ai Garcia MD   2/1/2020

## 2020-02-03 ENCOUNTER — HOSPITAL ENCOUNTER (OUTPATIENT)
Age: 65
Setting detail: OUTPATIENT SURGERY
Discharge: HOME | End: 2020-02-03
Attending: ORTHOPAEDIC SURGERY | Admitting: ORTHOPAEDIC SURGERY

## 2020-02-03 ENCOUNTER — ANESTHESIA (OUTPATIENT)
Dept: OPERATING ROOM | Age: 65
End: 2020-02-03

## 2020-02-03 VITALS
HEIGHT: 61 IN | TEMPERATURE: 97.4 F | HEART RATE: 73 BPM | OXYGEN SATURATION: 96 % | DIASTOLIC BLOOD PRESSURE: 57 MMHG | RESPIRATION RATE: 16 BRPM | SYSTOLIC BLOOD PRESSURE: 150 MMHG | WEIGHT: 127 LBS | BODY MASS INDEX: 23.98 KG/M2

## 2020-02-03 VITALS
SYSTOLIC BLOOD PRESSURE: 167 MMHG | RESPIRATION RATE: 14 BRPM | DIASTOLIC BLOOD PRESSURE: 85 MMHG | OXYGEN SATURATION: 100 %

## 2020-02-03 PROCEDURE — 87070 CULTURE OTHR SPECIMN AEROBIC: CPT

## 2020-02-03 PROCEDURE — 2580000003 HC RX 258: Performed by: ANESTHESIOLOGY

## 2020-02-03 PROCEDURE — 3700000000 HC ANESTHESIA ATTENDED CARE: Performed by: ORTHOPAEDIC SURGERY

## 2020-02-03 PROCEDURE — 3600000003 HC SURGERY LEVEL 3 BASE: Performed by: ORTHOPAEDIC SURGERY

## 2020-02-03 PROCEDURE — 6360000002 HC RX W HCPCS: Performed by: ORTHOPAEDIC SURGERY

## 2020-02-03 PROCEDURE — 87075 CULTR BACTERIA EXCEPT BLOOD: CPT

## 2020-02-03 PROCEDURE — 87206 SMEAR FLUORESCENT/ACID STAI: CPT

## 2020-02-03 PROCEDURE — 2580000003 HC RX 258: Performed by: ORTHOPAEDIC SURGERY

## 2020-02-03 PROCEDURE — 87205 SMEAR GRAM STAIN: CPT

## 2020-02-03 PROCEDURE — 2500000003 HC RX 250 WO HCPCS: Performed by: NURSE ANESTHETIST, CERTIFIED REGISTERED

## 2020-02-03 PROCEDURE — 87102 FUNGUS ISOLATION CULTURE: CPT

## 2020-02-03 PROCEDURE — 7100000011 HC PHASE II RECOVERY - ADDTL 15 MIN: Performed by: ORTHOPAEDIC SURGERY

## 2020-02-03 PROCEDURE — 87116 MYCOBACTERIA CULTURE: CPT

## 2020-02-03 PROCEDURE — C1713 ANCHOR/SCREW BN/BN,TIS/BN: HCPCS | Performed by: ORTHOPAEDIC SURGERY

## 2020-02-03 PROCEDURE — 3600000013 HC SURGERY LEVEL 3 ADDTL 15MIN: Performed by: ORTHOPAEDIC SURGERY

## 2020-02-03 PROCEDURE — 2709999900 HC NON-CHARGEABLE SUPPLY: Performed by: ORTHOPAEDIC SURGERY

## 2020-02-03 PROCEDURE — 6360000002 HC RX W HCPCS: Performed by: NURSE ANESTHETIST, CERTIFIED REGISTERED

## 2020-02-03 PROCEDURE — 7100000000 HC PACU RECOVERY - FIRST 15 MIN: Performed by: ORTHOPAEDIC SURGERY

## 2020-02-03 PROCEDURE — 87015 SPECIMEN INFECT AGNT CONCNTJ: CPT

## 2020-02-03 PROCEDURE — 3700000001 HC ADD 15 MINUTES (ANESTHESIA): Performed by: ORTHOPAEDIC SURGERY

## 2020-02-03 PROCEDURE — 7100000010 HC PHASE II RECOVERY - FIRST 15 MIN: Performed by: ORTHOPAEDIC SURGERY

## 2020-02-03 DEVICE — RESORBABLE MINI BEAD KIT
Type: IMPLANTABLE DEVICE | Site: ANKLE | Status: FUNCTIONAL
Brand: OSTEOSET

## 2020-02-03 RX ORDER — HYDRALAZINE HYDROCHLORIDE 20 MG/ML
5 INJECTION INTRAMUSCULAR; INTRAVENOUS EVERY 10 MIN PRN
Status: DISCONTINUED | OUTPATIENT
Start: 2020-02-03 | End: 2020-02-03 | Stop reason: HOSPADM

## 2020-02-03 RX ORDER — MEPERIDINE HYDROCHLORIDE 50 MG/ML
12.5 INJECTION INTRAMUSCULAR; INTRAVENOUS; SUBCUTANEOUS EVERY 5 MIN PRN
Status: DISCONTINUED | OUTPATIENT
Start: 2020-02-03 | End: 2020-02-03 | Stop reason: HOSPADM

## 2020-02-03 RX ORDER — HYDROCODONE BITARTRATE AND ACETAMINOPHEN 5; 325 MG/1; MG/1
1 TABLET ORAL EVERY 6 HOURS PRN
Qty: 28 TABLET | Refills: 0 | Status: SHIPPED | OUTPATIENT
Start: 2020-02-03 | End: 2020-02-10

## 2020-02-03 RX ORDER — SODIUM CHLORIDE, SODIUM LACTATE, POTASSIUM CHLORIDE, CALCIUM CHLORIDE 600; 310; 30; 20 MG/100ML; MG/100ML; MG/100ML; MG/100ML
INJECTION, SOLUTION INTRAVENOUS CONTINUOUS
Status: DISCONTINUED | OUTPATIENT
Start: 2020-02-03 | End: 2020-02-03 | Stop reason: HOSPADM

## 2020-02-03 RX ORDER — FENTANYL CITRATE 50 UG/ML
INJECTION, SOLUTION INTRAMUSCULAR; INTRAVENOUS PRN
Status: DISCONTINUED | OUTPATIENT
Start: 2020-02-03 | End: 2020-02-03 | Stop reason: SDUPTHER

## 2020-02-03 RX ORDER — ONDANSETRON 2 MG/ML
INJECTION INTRAMUSCULAR; INTRAVENOUS PRN
Status: DISCONTINUED | OUTPATIENT
Start: 2020-02-03 | End: 2020-02-03 | Stop reason: SDUPTHER

## 2020-02-03 RX ORDER — DEXAMETHASONE SODIUM PHOSPHATE 4 MG/ML
INJECTION, SOLUTION INTRA-ARTICULAR; INTRALESIONAL; INTRAMUSCULAR; INTRAVENOUS; SOFT TISSUE PRN
Status: DISCONTINUED | OUTPATIENT
Start: 2020-02-03 | End: 2020-02-03 | Stop reason: SDUPTHER

## 2020-02-03 RX ORDER — ONDANSETRON 2 MG/ML
4 INJECTION INTRAMUSCULAR; INTRAVENOUS PRN
Status: DISCONTINUED | OUTPATIENT
Start: 2020-02-03 | End: 2020-02-03 | Stop reason: HOSPADM

## 2020-02-03 RX ORDER — PROMETHAZINE HYDROCHLORIDE 25 MG/ML
6.25 INJECTION, SOLUTION INTRAMUSCULAR; INTRAVENOUS
Status: DISCONTINUED | OUTPATIENT
Start: 2020-02-03 | End: 2020-02-03 | Stop reason: HOSPADM

## 2020-02-03 RX ORDER — VANCOMYCIN HYDROCHLORIDE 500 MG/10ML
INJECTION, POWDER, LYOPHILIZED, FOR SOLUTION INTRAVENOUS PRN
Status: DISCONTINUED | OUTPATIENT
Start: 2020-02-03 | End: 2020-02-03 | Stop reason: ALTCHOICE

## 2020-02-03 RX ORDER — LABETALOL HYDROCHLORIDE 5 MG/ML
5 INJECTION, SOLUTION INTRAVENOUS EVERY 10 MIN PRN
Status: DISCONTINUED | OUTPATIENT
Start: 2020-02-03 | End: 2020-02-03 | Stop reason: HOSPADM

## 2020-02-03 RX ORDER — DIPHENHYDRAMINE HYDROCHLORIDE 50 MG/ML
12.5 INJECTION INTRAMUSCULAR; INTRAVENOUS
Status: DISCONTINUED | OUTPATIENT
Start: 2020-02-03 | End: 2020-02-03 | Stop reason: HOSPADM

## 2020-02-03 RX ORDER — MIDAZOLAM HYDROCHLORIDE 1 MG/ML
INJECTION INTRAMUSCULAR; INTRAVENOUS PRN
Status: DISCONTINUED | OUTPATIENT
Start: 2020-02-03 | End: 2020-02-03 | Stop reason: SDUPTHER

## 2020-02-03 RX ORDER — VANCOMYCIN HYDROCHLORIDE 500 MG/10ML
INJECTION, POWDER, LYOPHILIZED, FOR SOLUTION INTRAVENOUS
Status: DISCONTINUED
Start: 2020-02-03 | End: 2020-02-03 | Stop reason: HOSPADM

## 2020-02-03 RX ORDER — MAGNESIUM HYDROXIDE 1200 MG/15ML
LIQUID ORAL CONTINUOUS PRN
Status: COMPLETED | OUTPATIENT
Start: 2020-02-03 | End: 2020-02-03

## 2020-02-03 RX ORDER — OXYCODONE HYDROCHLORIDE AND ACETAMINOPHEN 5; 325 MG/1; MG/1
2 TABLET ORAL PRN
Status: DISCONTINUED | OUTPATIENT
Start: 2020-02-03 | End: 2020-02-03 | Stop reason: HOSPADM

## 2020-02-03 RX ORDER — MORPHINE SULFATE 2 MG/ML
1 INJECTION, SOLUTION INTRAMUSCULAR; INTRAVENOUS EVERY 5 MIN PRN
Status: DISCONTINUED | OUTPATIENT
Start: 2020-02-03 | End: 2020-02-03 | Stop reason: HOSPADM

## 2020-02-03 RX ORDER — PROPOFOL 10 MG/ML
INJECTION, EMULSION INTRAVENOUS PRN
Status: DISCONTINUED | OUTPATIENT
Start: 2020-02-03 | End: 2020-02-03 | Stop reason: SDUPTHER

## 2020-02-03 RX ORDER — MORPHINE SULFATE 2 MG/ML
2 INJECTION, SOLUTION INTRAMUSCULAR; INTRAVENOUS EVERY 5 MIN PRN
Status: DISCONTINUED | OUTPATIENT
Start: 2020-02-03 | End: 2020-02-03 | Stop reason: HOSPADM

## 2020-02-03 RX ORDER — LIDOCAINE HYDROCHLORIDE 10 MG/ML
INJECTION, SOLUTION INFILTRATION; PERINEURAL PRN
Status: DISCONTINUED | OUTPATIENT
Start: 2020-02-03 | End: 2020-02-03 | Stop reason: SDUPTHER

## 2020-02-03 RX ORDER — SODIUM CHLORIDE 0.9 % (FLUSH) 0.9 %
SYRINGE (ML) INJECTION
Status: DISCONTINUED
Start: 2020-02-03 | End: 2020-02-03 | Stop reason: HOSPADM

## 2020-02-03 RX ORDER — OXYCODONE HYDROCHLORIDE AND ACETAMINOPHEN 5; 325 MG/1; MG/1
1 TABLET ORAL PRN
Status: DISCONTINUED | OUTPATIENT
Start: 2020-02-03 | End: 2020-02-03 | Stop reason: HOSPADM

## 2020-02-03 RX ADMIN — ONDANSETRON 4 MG: 2 INJECTION INTRAMUSCULAR; INTRAVENOUS at 07:22

## 2020-02-03 RX ADMIN — CEFAZOLIN SODIUM 2 G: 10 INJECTION, POWDER, FOR SOLUTION INTRAVENOUS at 07:13

## 2020-02-03 RX ADMIN — FENTANYL CITRATE 50 MCG: 50 INJECTION INTRAMUSCULAR; INTRAVENOUS at 07:17

## 2020-02-03 RX ADMIN — MIDAZOLAM HYDROCHLORIDE 2 MG: 2 INJECTION, SOLUTION INTRAMUSCULAR; INTRAVENOUS at 07:13

## 2020-02-03 RX ADMIN — PROPOFOL 140 MG: 10 INJECTION, EMULSION INTRAVENOUS at 07:17

## 2020-02-03 RX ADMIN — FENTANYL CITRATE 50 MCG: 50 INJECTION INTRAMUSCULAR; INTRAVENOUS at 07:24

## 2020-02-03 RX ADMIN — SODIUM CHLORIDE, POTASSIUM CHLORIDE, SODIUM LACTATE AND CALCIUM CHLORIDE: 600; 310; 30; 20 INJECTION, SOLUTION INTRAVENOUS at 07:04

## 2020-02-03 RX ADMIN — PROPOFOL 50 MG: 10 INJECTION, EMULSION INTRAVENOUS at 07:32

## 2020-02-03 RX ADMIN — DEXAMETHASONE SODIUM PHOSPHATE 10 MG: 4 INJECTION, SOLUTION INTRAMUSCULAR; INTRAVENOUS at 07:22

## 2020-02-03 RX ADMIN — LIDOCAINE HYDROCHLORIDE 40 MG: 10 INJECTION, SOLUTION INFILTRATION; PERINEURAL at 07:17

## 2020-02-03 ASSESSMENT — PULMONARY FUNCTION TESTS
PIF_VALUE: 2
PIF_VALUE: 13
PIF_VALUE: 3
PIF_VALUE: 14
PIF_VALUE: 12
PIF_VALUE: 12
PIF_VALUE: 21
PIF_VALUE: 1
PIF_VALUE: 4
PIF_VALUE: 14
PIF_VALUE: 3
PIF_VALUE: 12
PIF_VALUE: 14
PIF_VALUE: 20
PIF_VALUE: 2
PIF_VALUE: 3
PIF_VALUE: 13
PIF_VALUE: 13
PIF_VALUE: 12
PIF_VALUE: 3
PIF_VALUE: 2
PIF_VALUE: 12
PIF_VALUE: 1
PIF_VALUE: 2
PIF_VALUE: 13
PIF_VALUE: 3
PIF_VALUE: 1
PIF_VALUE: 2
PIF_VALUE: 2
PIF_VALUE: 12
PIF_VALUE: 2
PIF_VALUE: 2
PIF_VALUE: 13
PIF_VALUE: 3
PIF_VALUE: 2
PIF_VALUE: 13
PIF_VALUE: 2
PIF_VALUE: 13
PIF_VALUE: 3
PIF_VALUE: 14
PIF_VALUE: 13
PIF_VALUE: 14
PIF_VALUE: 13
PIF_VALUE: 14
PIF_VALUE: 2
PIF_VALUE: 12
PIF_VALUE: 13
PIF_VALUE: 13
PIF_VALUE: 2
PIF_VALUE: 12
PIF_VALUE: 3
PIF_VALUE: 14
PIF_VALUE: 13
PIF_VALUE: 13
PIF_VALUE: 4
PIF_VALUE: 14
PIF_VALUE: 13
PIF_VALUE: 13
PIF_VALUE: 3
PIF_VALUE: 3

## 2020-02-03 ASSESSMENT — COPD QUESTIONNAIRES: CAT_SEVERITY: MODERATE

## 2020-02-03 ASSESSMENT — PAIN - FUNCTIONAL ASSESSMENT: PAIN_FUNCTIONAL_ASSESSMENT: 0-10

## 2020-02-03 NOTE — ANESTHESIA POSTPROCEDURE EVALUATION
4.3                 10/28/2018 03:00 PM        CL                       102                 10/28/2018 03:00 PM        CO2                      29                  10/28/2018 03:00 PM        BUN                      18                  10/28/2018 03:00 PM        CREATININE               0.8                 10/28/2018 03:00 PM        GLUCOSE                  98                  10/28/2018 03:00 PM   COAGS  Lab Results       Component                Value               Date/Time                  PROTIME                  11.6                11/26/2017 03:45 PM        INR                      1.03                11/26/2017 03:45 PM     Intake & Output:  @84YVHF@    Nausea & Vomiting:  No    Level of Consciousness:  Awake    Pain Assessment:  Adequate analgesia    Anesthesia Complications:  No apparent anesthetic complications    SUMMARY      Vital signs stable  OK to discharge from Stage I post anesthesia care.   Care transferred from Anesthesiology department on discharge from perioperative area

## 2020-02-03 NOTE — OP NOTE
[facility] [place] [address]                                OPERATIVE REPORT    PATIENT NAME: Wende Landau                      :        1955  MED REC NO:   [id number]                          ROOM:  ACCOUNT NO:   [de-identified]                           ADMIT DATE: 2020  PROVIDER:     Rayray Tavera MD    DATE OF PROCEDURE:  2020    PRIMARY SURGEON:  Rayray Tavera MD    ANESTHESIA:  General.    ANTIBIOTICS:  Doxycycline. DRAINS:  None. TUBES:  None. Estimated blood loss less than 25 cc    SPECIMENS:  Deep cultures including bone x2. INDICATIONS:  The patient is a 80-year-old female who had an ankle  fracture that underwent open reduction and internal fixation in  [place]. She developed wound healing problems and now presents for  removal of her hardware on the lateral side. Her medial hardware had no  reaction. She is a very large area of erythema around the wound on the  lateral side. Risks and benefits of surgery were explained to the  patient. Informed consent was signed in the chart prior to surgery. PROCEDURE:  The patient was brought to the operating room and after  adequate general anesthesia, placed in supine position. Nonsterile  tourniquet was placed around proximal aspect of her left upper thigh. Left lower extremity prepped and draped in sterile fashion. Leg was  exsanguinated, tourniquet inflated to 350 mmHg. At this point the  previous incision was used and opened up using a 15-blade. The small  fistula areas were excised in the ellipse fashion and this was taken all  the way down to the plate. The plate was exposed and screws removed  without difficulty. There was no obvious purulence, but the area was  cultured x2 and bone was sent. The lag screw was removed and then each  of the screw holes that were curetted out.   The wound was then copiously  irrigated with normal saline using a Pulsavac. The screw holes were  then packed with OsteoSet vancomycin beads and vancomycin powder poured  into the wound. The wound was closed using 4-0 nylon suture in simple  fashion. The area was dressed with Adaptic dry sterile dressing,  sterile Webril, ABDs and placed into a posterior splint. Tourniquet was  deflated after less than 1 hour. Toes were noted to pink up nicely. The patient was awake in the operating room, brought to the PACU in good  condition. ADDENDUM:  Three views of the ankle were obtained multiple times  throughout the procedure. Final images were obtained, read by me and  showed that the lateral plate and screws including the lag screw been  removed. Mortise was well reduced.         Catracho Olivera MD    D: 02/03/2020 8:15:10       T: 02/03/2020 8:19:17     [email or web address]  Job#: [id number]     Doc#: [id number]    CC:

## 2020-02-08 LAB
ANAEROBIC CULTURE: NORMAL
CULTURE SURGICAL: NORMAL
GRAM STAIN RESULT: NORMAL

## 2020-02-10 ENCOUNTER — TELEPHONE (OUTPATIENT)
Dept: ORTHOPEDIC SURGERY | Age: 65
End: 2020-02-10

## 2020-02-10 RX ORDER — ONDANSETRON 4 MG/1
4 TABLET, FILM COATED ORAL 3 TIMES DAILY PRN
Qty: 30 TABLET | Refills: 0 | Status: SHIPPED | OUTPATIENT
Start: 2020-02-10 | End: 2020-05-05 | Stop reason: SDUPTHER

## 2020-02-10 NOTE — TELEPHONE ENCOUNTER
Pt states that her pain meds are making her very nauseous and would like something called in. Rx was sent for zofran was sent by e-scribe per JCL's verbal instructions.

## 2020-02-20 ENCOUNTER — OFFICE VISIT (OUTPATIENT)
Dept: ORTHOPEDIC SURGERY | Age: 65
End: 2020-02-20

## 2020-02-20 VITALS — WEIGHT: 126.98 LBS | BODY MASS INDEX: 23.98 KG/M2 | HEIGHT: 61 IN

## 2020-02-20 PROCEDURE — 99024 POSTOP FOLLOW-UP VISIT: CPT | Performed by: ORTHOPAEDIC SURGERY

## 2020-02-26 ENCOUNTER — OFFICE VISIT (OUTPATIENT)
Dept: ORTHOPEDIC SURGERY | Age: 65
End: 2020-02-26

## 2020-02-26 VITALS — WEIGHT: 126.98 LBS | BODY MASS INDEX: 23.98 KG/M2 | HEIGHT: 61 IN

## 2020-02-26 PROCEDURE — 99024 POSTOP FOLLOW-UP VISIT: CPT | Performed by: ORTHOPAEDIC SURGERY

## 2020-03-09 LAB
FUNGUS (MYCOLOGY) CULTURE: NORMAL
FUNGUS STAIN: NORMAL

## 2020-03-10 RX ORDER — VENLAFAXINE 75 MG/1
75 TABLET ORAL SEE ADMIN INSTRUCTIONS
Qty: 90 TABLET | Refills: 5 | Status: SHIPPED | OUTPATIENT
Start: 2020-03-10

## 2020-03-24 LAB
AFB CULTURE (MYCOBACTERIA): NORMAL
AFB SMEAR: NORMAL

## 2020-05-05 ENCOUNTER — OFFICE VISIT (OUTPATIENT)
Dept: INTERNAL MEDICINE CLINIC | Age: 65
End: 2020-05-05

## 2020-05-05 VITALS
OXYGEN SATURATION: 96 % | TEMPERATURE: 97.9 F | WEIGHT: 123 LBS | HEIGHT: 61 IN | SYSTOLIC BLOOD PRESSURE: 130 MMHG | BODY MASS INDEX: 23.22 KG/M2 | HEART RATE: 93 BPM | DIASTOLIC BLOOD PRESSURE: 69 MMHG

## 2020-05-05 PROCEDURE — 99214 OFFICE O/P EST MOD 30 MIN: CPT | Performed by: INTERNAL MEDICINE

## 2020-05-05 PROCEDURE — G0444 DEPRESSION SCREEN ANNUAL: HCPCS | Performed by: INTERNAL MEDICINE

## 2020-05-05 RX ORDER — ONDANSETRON 4 MG/1
4 TABLET, FILM COATED ORAL 3 TIMES DAILY PRN
Qty: 30 TABLET | Refills: 2 | Status: SHIPPED | OUTPATIENT
Start: 2020-05-05

## 2020-05-05 RX ORDER — BUPROPION HYDROCHLORIDE 150 MG/1
150 TABLET, EXTENDED RELEASE ORAL 2 TIMES DAILY
Qty: 60 TABLET | Refills: 5 | Status: SHIPPED | OUTPATIENT
Start: 2020-05-05

## 2020-05-05 ASSESSMENT — PATIENT HEALTH QUESTIONNAIRE - PHQ9
SUM OF ALL RESPONSES TO PHQ QUESTIONS 1-9: 16
SUM OF ALL RESPONSES TO PHQ QUESTIONS 1-9: 16
10. IF YOU CHECKED OFF ANY PROBLEMS, HOW DIFFICULT HAVE THESE PROBLEMS MADE IT FOR YOU TO DO YOUR WORK, TAKE CARE OF THINGS AT HOME, OR GET ALONG WITH OTHER PEOPLE: 0
6. FEELING BAD ABOUT YOURSELF - OR THAT YOU ARE A FAILURE OR HAVE LET YOURSELF OR YOUR FAMILY DOWN: 2
SUM OF ALL RESPONSES TO PHQ9 QUESTIONS 1 & 2: 4
7. TROUBLE CONCENTRATING ON THINGS, SUCH AS READING THE NEWSPAPER OR WATCHING TELEVISION: 2
2. FEELING DOWN, DEPRESSED OR HOPELESS: 2
8. MOVING OR SPEAKING SO SLOWLY THAT OTHER PEOPLE COULD HAVE NOTICED. OR THE OPPOSITE, BEING SO FIGETY OR RESTLESS THAT YOU HAVE BEEN MOVING AROUND A LOT MORE THAN USUAL: 2
4. FEELING TIRED OR HAVING LITTLE ENERGY: 2
5. POOR APPETITE OR OVEREATING: 2
9. THOUGHTS THAT YOU WOULD BE BETTER OFF DEAD, OR OF HURTING YOURSELF: 0
1. LITTLE INTEREST OR PLEASURE IN DOING THINGS: 2
3. TROUBLE FALLING OR STAYING ASLEEP: 2

## 2020-05-26 ENCOUNTER — OFFICE VISIT (OUTPATIENT)
Dept: INTERNAL MEDICINE CLINIC | Age: 65
End: 2020-05-26

## 2020-05-26 VITALS
WEIGHT: 124 LBS | DIASTOLIC BLOOD PRESSURE: 77 MMHG | BODY MASS INDEX: 23.41 KG/M2 | OXYGEN SATURATION: 95 % | HEART RATE: 78 BPM | HEIGHT: 61 IN | TEMPERATURE: 97.4 F | SYSTOLIC BLOOD PRESSURE: 134 MMHG

## 2020-05-26 LAB
BILIRUBIN, POC: ABNORMAL
BLOOD URINE, POC: ABNORMAL
CLARITY, POC: ABNORMAL
COLOR, POC: ABNORMAL
GLUCOSE URINE, POC: ABNORMAL
KETONES, POC: ABNORMAL
LEUKOCYTE EST, POC: ABNORMAL
NITRITE, POC: ABNORMAL
PH, POC: 6
PROTEIN, POC: ABNORMAL
SPECIFIC GRAVITY, POC: 1015
UROBILINOGEN, POC: NORMAL

## 2020-05-26 PROCEDURE — 99214 OFFICE O/P EST MOD 30 MIN: CPT | Performed by: INTERNAL MEDICINE

## 2020-05-26 PROCEDURE — 81002 URINALYSIS NONAUTO W/O SCOPE: CPT | Performed by: INTERNAL MEDICINE

## 2020-05-26 RX ORDER — NITROFURANTOIN 25; 75 MG/1; MG/1
100 CAPSULE ORAL 2 TIMES DAILY
Qty: 10 CAPSULE | Refills: 0 | Status: SHIPPED | OUTPATIENT
Start: 2020-05-26 | End: 2020-05-31

## 2020-05-26 RX ORDER — AMLODIPINE BESYLATE 5 MG/1
10 TABLET ORAL DAILY
Qty: 60 TABLET | Refills: 5 | Status: SHIPPED | OUTPATIENT
Start: 2020-05-26

## 2020-05-26 NOTE — PROGRESS NOTES
SUBJECTIVE:   Follow up from 5/5 for anxious depression, improved with adding bupropion to venlafaxine. Concerned about dysuria. Continues to take Zofran almost daily for ongoing nausea with occasional emesis. MEDICATIONS:  ondansetron (ZOFRAN) 4 MG tablet Take 1 tablet by mouth 3 times daily as needed for Nausea   buPROPion (WELLBUTRIN SR) 150 MG Take 1 tablet by mouth 2 times daily   venlafaxine (EFFEXOR) 75 MG tablet Taking 150 mg qAM, 75 mg qPM   doxycycline hyclate (VIBRA-TABS) 100 MG tablet Take 1 tablet by mouth 2 times daily   amLODIPine (NORVASC) 5 MG tablet Take 2 tablets by mouth daily   albuterol sulfate  (90 Base) MCG/ACT inhaler Inhale 2 puffs into the lungs every 6 hours as needed    aspirin 81 MG chewable tablet Take 81 mg by mouth daily   tiotropium (SPIRIVA HANDIHALER) 18 MCG inhalation Inhale 1 capsule into the lungs daily   albuterol (PROVENTIL) (2.5 MG/3ML) 0.083% nebulizer Take 2.5 mg by nebulization every 6 hours as needed       Lab Results   Component Value Date    WBC 10.4 10/28/2018    HGB 10.1 (L) 10/28/2018     (H) 10/28/2018    MCV 94.6 10/28/2018     Lab Results   Component Value Date     10/28/2018    K 4.3 10/28/2018     10/28/2018    CO2 29 10/28/2018    BUN 18 10/28/2018    CREATININE 0.8 10/28/2018    GLUCOSE 98 10/28/2018     Urinalysis: large leuk est / negative nitrite     OBJECTIVE:    /77   Pulse 78   Temp 97.4 °F (36.3 °C)   Ht 5' 1\" (1.549 m)   Wt 124 lb (56.2 kg)   SpO2 95%   BMI 23.43 kg/m²   HEENT:  Oropharynx clear, no lymphadenopathy,   LUNGS:  Clear and without wheezes  HEART:  Regular rhythm, no appreciable murmur  ABD:  Benign, active bowel sounds, no flank tenderness  EXT:  No edema, pulses palpable  NEURO:  No focal neurologic deficits noted. ASSESSMENT / PLAN:   1.  Hypertension (elevated blood pressure):  Blood pressure good today at 134/77, goal less than 140/90.  Continue taking amlodipine (Norvasc) 10 mg (two 5

## 2020-11-09 ENCOUNTER — TELEPHONE (OUTPATIENT)
Dept: OTHER | Age: 65
End: 2020-11-09

## 2020-11-09 NOTE — TELEPHONE ENCOUNTER
PRAKASH-CECELIA attempted to contact patient on this date to follow up mailing received. SW left voicemail requesting return phone call.

## 2020-11-11 ENCOUNTER — COMMUNITY OUTREACH (OUTPATIENT)
Dept: OTHER | Age: 65
End: 2020-11-11

## (undated) DEVICE — SUTURE ETHLN SZ 4-0 L18IN NONABSORBABLE BLK L19MM PS-2 3/8 1667H

## (undated) DEVICE — GLOVE SURG SZ 8 L12IN FNGR THK94MIL STD WHT LTX FREE

## (undated) DEVICE — SUTURE VCRL SZ 3-0 L18IN ABSRB UD L26MM SH 1/2 CIR J864D

## (undated) DEVICE — DRAPE EQUIP C ARM MINI 10000100] TIDI PRODUCTS INC]

## (undated) DEVICE — PADDING CAST W4INXL4YD ST COT RAYON MICROPLEATED HIGHLY

## (undated) DEVICE — SOLUTION IV IRRIG LACTATED RINGERS 3000ML 2B7487

## (undated) DEVICE — SET GRAV VENT NVENT CK VLV 3 NDL FREE PRT 10 GTT

## (undated) DEVICE — PADDING UNDERCAST W4INXL4YD COT FBR LO LINTING WYTEX

## (undated) DEVICE — 3M™ TEGADERM™ TRANSPARENT FILM DRESSING FRAME STYLE, 1624W, 2-3/8 IN X 2-3/4 IN (6 CM X 7 CM), 100/CT 4CT/CASE: Brand: 3M™ TEGADERM™

## (undated) DEVICE — PACK EXTREMITY XR

## (undated) DEVICE — SOLUTION IV 1000ML LAC RINGERS PH 6.5 INJ USP VIAFLX PLAS

## (undated) DEVICE — Device

## (undated) DEVICE — 1200CC HIFLOW SUCTION CANISTER WITH AEROSTAT FILTER, FLOAT VALVE SHUTOFF WITH GREEN LID: Brand: BEMIS

## (undated) DEVICE — GLOVE ORANGE PI 7   MSG9070

## (undated) DEVICE — CATHETER IV 20GA L1.25IN PNK FEP SFTY STR HUB RADPQ DISP

## (undated) DEVICE — Z CONVERTED USE 2271043 CONTAINER SPEC COLL 4OZ SCR ON LID PEEL PCH

## (undated) DEVICE — HANDPIECE SET WITH HIGH FLOW TIP AND SUCTION TUBE: Brand: INTERPULSE

## (undated) DEVICE — STERILE POLYISOPRENE POWDER-FREE SURGICAL GLOVES: Brand: PROTEXIS

## (undated) DEVICE — SET ADMIN PRIMING 7ML L30IN 7.35LB 20 GTT 2ND RLER CLMP

## (undated) DEVICE — GLOVE SURG SZ 65 THK91MIL LTX FREE SYN POLYISOPRENE

## (undated) DEVICE — SOLUTION IV IRRIG 500ML 0.9% SODIUM CHL 2F7123